# Patient Record
Sex: MALE | Race: ASIAN | NOT HISPANIC OR LATINO | URBAN - METROPOLITAN AREA
[De-identification: names, ages, dates, MRNs, and addresses within clinical notes are randomized per-mention and may not be internally consistent; named-entity substitution may affect disease eponyms.]

---

## 2017-02-17 ENCOUNTER — CHARTING TRANS (OUTPATIENT)
Dept: URGENT CARE | Age: 47
End: 2017-02-17

## 2017-02-17 ENCOUNTER — MYAURORA ACCOUNT LINK (OUTPATIENT)
Dept: OTHER | Age: 47
End: 2017-02-17

## 2017-02-17 ASSESSMENT — PAIN SCALES - GENERAL: PAINLEVEL_OUTOF10: 0

## 2017-02-18 ENCOUNTER — CHARTING TRANS (OUTPATIENT)
Dept: OTHER | Age: 47
End: 2017-02-18

## 2017-11-19 ENCOUNTER — CHARTING TRANS (OUTPATIENT)
Dept: URGENT CARE | Age: 47
End: 2017-11-19

## 2017-11-19 ENCOUNTER — MYAURORA ACCOUNT LINK (OUTPATIENT)
Dept: OTHER | Age: 47
End: 2017-11-19

## 2017-11-19 ASSESSMENT — PAIN SCALES - GENERAL: PAINLEVEL_OUTOF10: 5

## 2018-02-14 ENCOUNTER — MYAURORA ACCOUNT LINK (OUTPATIENT)
Dept: OTHER | Age: 48
End: 2018-02-14

## 2018-02-14 ENCOUNTER — CHARTING TRANS (OUTPATIENT)
Dept: OTHER | Age: 48
End: 2018-02-14

## 2018-11-27 VITALS
OXYGEN SATURATION: 97 % | DIASTOLIC BLOOD PRESSURE: 87 MMHG | RESPIRATION RATE: 16 BRPM | HEART RATE: 86 BPM | TEMPERATURE: 97.7 F | SYSTOLIC BLOOD PRESSURE: 130 MMHG

## 2018-11-29 VITALS
HEART RATE: 94 BPM | OXYGEN SATURATION: 98 % | TEMPERATURE: 98.3 F | RESPIRATION RATE: 16 BRPM | DIASTOLIC BLOOD PRESSURE: 74 MMHG | SYSTOLIC BLOOD PRESSURE: 102 MMHG

## 2019-03-06 VITALS
SYSTOLIC BLOOD PRESSURE: 108 MMHG | DIASTOLIC BLOOD PRESSURE: 74 MMHG | OXYGEN SATURATION: 99 % | TEMPERATURE: 100.2 F | HEART RATE: 106 BPM | RESPIRATION RATE: 16 BRPM

## 2022-06-22 ENCOUNTER — ANESTHESIA (OUTPATIENT)
Dept: CARDIAC SURGERY | Facility: HOSPITAL | Age: 52
End: 2022-06-22
Payer: COMMERCIAL

## 2022-06-22 ENCOUNTER — HOSPITAL ENCOUNTER (INPATIENT)
Facility: HOSPITAL | Age: 52
LOS: 6 days | Discharge: HOME | End: 2022-06-28
Attending: EMERGENCY MEDICINE | Admitting: INTERNAL MEDICINE
Payer: COMMERCIAL

## 2022-06-22 ENCOUNTER — APPOINTMENT (OUTPATIENT)
Dept: CV DIAGNOSTICS | Facility: HOSPITAL | Age: 52
End: 2022-06-22
Attending: INTERNAL MEDICINE
Payer: COMMERCIAL

## 2022-06-22 ENCOUNTER — APPOINTMENT (OUTPATIENT)
Dept: GENERAL RADIOLOGY | Facility: HOSPITAL | Age: 52
End: 2022-06-22
Payer: COMMERCIAL

## 2022-06-22 ENCOUNTER — APPOINTMENT (OUTPATIENT)
Dept: INTERVENTIONAL RADIOLOGY/VASCULAR | Facility: HOSPITAL | Age: 52
End: 2022-06-22
Attending: INTERNAL MEDICINE
Payer: COMMERCIAL

## 2022-06-22 ENCOUNTER — ANESTHESIA EVENT (OUTPATIENT)
Dept: CARDIAC SURGERY | Facility: HOSPITAL | Age: 52
End: 2022-06-22
Payer: COMMERCIAL

## 2022-06-22 ENCOUNTER — APPOINTMENT (OUTPATIENT)
Dept: ULTRASOUND IMAGING | Facility: HOSPITAL | Age: 52
End: 2022-06-22
Attending: STUDENT IN AN ORGANIZED HEALTH CARE EDUCATION/TRAINING PROGRAM
Payer: COMMERCIAL

## 2022-06-22 ENCOUNTER — APPOINTMENT (OUTPATIENT)
Dept: GENERAL RADIOLOGY | Facility: HOSPITAL | Age: 52
End: 2022-06-22
Attending: THORACIC SURGERY (CARDIOTHORACIC VASCULAR SURGERY)
Payer: COMMERCIAL

## 2022-06-22 DIAGNOSIS — R57.0 CARDIOGENIC SHOCK (HCC): ICD-10-CM

## 2022-06-22 DIAGNOSIS — I21.4 NSTEMI (NON-ST ELEVATED MYOCARDIAL INFARCTION) (HCC): ICD-10-CM

## 2022-06-22 DIAGNOSIS — J90 PLEURAL EFFUSION: ICD-10-CM

## 2022-06-22 DIAGNOSIS — I25.10 CAD, MULTIPLE VESSEL: ICD-10-CM

## 2022-06-22 DIAGNOSIS — E11.9 TYPE 2 DIABETES MELLITUS WITHOUT COMPLICATION, WITHOUT LONG-TERM CURRENT USE OF INSULIN (HCC): ICD-10-CM

## 2022-06-22 DIAGNOSIS — R07.9 ACUTE CHEST PAIN: Primary | ICD-10-CM

## 2022-06-22 LAB
ADENOVIRUS PCR:: NOT DETECTED
ALBUMIN SERPL-MCNC: 4.1 G/DL (ref 3.4–5)
ALBUMIN/GLOB SERPL: 1.2 {RATIO} (ref 1–2)
ALP LIVER SERPL-CCNC: 130 U/L
ALT SERPL-CCNC: 46 U/L
ANION GAP SERPL CALC-SCNC: 6 MMOL/L (ref 0–18)
ANTIBODY SCREEN: NEGATIVE
AST SERPL-CCNC: 29 U/L (ref 15–37)
ATRIAL RATE: 67 BPM
ATRIAL RATE: 68 BPM
B PARAPERT DNA SPEC QL NAA+PROBE: NOT DETECTED
B PERT DNA SPEC QL NAA+PROBE: NOT DETECTED
BASE EXCESS BLD CALC-SCNC: -1 MMOL/L
BASE EXCESS BLD CALC-SCNC: -1 MMOL/L
BASE EXCESS BLD CALC-SCNC: -4 MMOL/L
BASE EXCESS BLDA CALC-SCNC: -1 MMOL/L (ref ?–30)
BASE EXCESS BLDMV CALC-SCNC: -3 MMOL/L (ref ?–30)
BASE EXCESS BLDMV CALC-SCNC: 0 MMOL/L (ref ?–30)
BASE EXCESS BLDMV CALC-SCNC: 1 MMOL/L (ref ?–30)
BASOPHILS # BLD AUTO: 0.03 X10(3) UL (ref 0–0.2)
BASOPHILS NFR BLD AUTO: 0.2 %
BILIRUB SERPL-MCNC: 0.9 MG/DL (ref 0.1–2)
BUN BLD-MCNC: 8 MG/DL (ref 7–18)
BUN BLD-MCNC: 8 MG/DL (ref 7–18)
C PNEUM DNA SPEC QL NAA+PROBE: NOT DETECTED
CA-I BLD-SCNC: 1.2 MMOL/L (ref 1.12–1.32)
CA-I BLD-SCNC: 1.26 MMOL/L (ref 1.12–1.32)
CA-I BLD-SCNC: 1.28 MMOL/L (ref 1.12–1.32)
CA-I BLDA-SCNC: 1.4 MMOL/L (ref 1.12–1.32)
CA-I BLDMV-SCNC: 1.11 MMOL/L (ref 1.12–1.32)
CA-I BLDMV-SCNC: 1.13 MMOL/L (ref 1.12–1.32)
CA-I BLDMV-SCNC: 1.14 MMOL/L (ref 1.12–1.32)
CALCIUM BLD-MCNC: 8.3 MG/DL (ref 8.5–10.1)
CALCIUM BLD-MCNC: 9.7 MG/DL (ref 8.5–10.1)
CHLORIDE SERPL-SCNC: 102 MMOL/L (ref 98–112)
CHLORIDE SERPL-SCNC: 112 MMOL/L (ref 98–112)
CHOLEST SERPL-MCNC: 228 MG/DL (ref ?–200)
CO2 BLD-SCNC: 23 MMOL/L (ref 22–32)
CO2 BLD-SCNC: 24 MMOL/L (ref 22–32)
CO2 BLD-SCNC: 25 MMOL/L (ref 22–32)
CO2 BLDA-SCNC: 26 MMOL/L (ref 22–32)
CO2 BLDMV-SCNC: 24 MMOL/L (ref 22–32)
CO2 BLDMV-SCNC: 26 MMOL/L (ref 22–32)
CO2 BLDMV-SCNC: 27 MMOL/L (ref 22–32)
CO2 SERPL-SCNC: 23 MMOL/L (ref 21–32)
CO2 SERPL-SCNC: 28 MMOL/L (ref 21–32)
CORONAVIRUS 229E PCR:: NOT DETECTED
CORONAVIRUS HKU1 PCR:: NOT DETECTED
CORONAVIRUS NL63 PCR:: NOT DETECTED
CORONAVIRUS OC43 PCR:: NOT DETECTED
CREAT BLD-MCNC: 0.79 MG/DL
CREAT BLD-MCNC: 0.88 MG/DL
D DIMER PPP FEU-MCNC: <0.27 UG/ML FEU (ref ?–0.5)
EOSINOPHIL # BLD AUTO: 0.08 X10(3) UL (ref 0–0.7)
EOSINOPHIL NFR BLD AUTO: 0.6 %
ERYTHROCYTE [DISTWIDTH] IN BLOOD BY AUTOMATED COUNT: 11.9 %
ERYTHROCYTE [DISTWIDTH] IN BLOOD BY AUTOMATED COUNT: 12 %
EST. AVERAGE GLUCOSE BLD GHB EST-MCNC: 275 MG/DL (ref 68–126)
FLUAV RNA SPEC QL NAA+PROBE: NOT DETECTED
FLUBV RNA SPEC QL NAA+PROBE: NOT DETECTED
GLOBULIN PLAS-MCNC: 3.3 G/DL (ref 2.8–4.4)
GLUCOSE BLD-MCNC: 148 MG/DL (ref 70–99)
GLUCOSE BLD-MCNC: 152 MG/DL (ref 70–99)
GLUCOSE BLD-MCNC: 177 MG/DL (ref 70–99)
GLUCOSE BLD-MCNC: 196 MG/DL (ref 70–99)
GLUCOSE BLD-MCNC: 196 MG/DL (ref 70–99)
GLUCOSE BLD-MCNC: 198 MG/DL (ref 70–99)
GLUCOSE BLD-MCNC: 201 MG/DL (ref 70–99)
GLUCOSE BLD-MCNC: 205 MG/DL (ref 70–99)
GLUCOSE BLD-MCNC: 206 MG/DL (ref 70–99)
GLUCOSE BLD-MCNC: 216 MG/DL (ref 70–99)
GLUCOSE BLD-MCNC: 226 MG/DL (ref 70–99)
GLUCOSE BLD-MCNC: 234 MG/DL (ref 70–99)
GLUCOSE BLD-MCNC: 284 MG/DL (ref 70–99)
GLUCOSE BLD-MCNC: 303 MG/DL (ref 70–99)
GLUCOSE BLDA-MCNC: 228 MG/DL (ref 70–99)
HBA1C MFR BLD: 11.2 % (ref ?–5.7)
HCO3 BLD-SCNC: 21.5 MEQ/L
HCO3 BLD-SCNC: 22.9 MEQ/L
HCO3 BLD-SCNC: 23.8 MEQ/L
HCO3 BLDA-SCNC: 24.8 MEQ/L (ref 22–26)
HCO3 BLDMV-SCNC: 23 MEQ/L (ref 22–26)
HCO3 BLDMV-SCNC: 24.9 MEQ/L (ref 22–26)
HCO3 BLDMV-SCNC: 25.9 MEQ/L (ref 22–26)
HCT VFR BLD AUTO: 38.2 %
HCT VFR BLD AUTO: 46.5 %
HCT VFR BLD CALC: 37 %
HCT VFR BLD CALC: 37 %
HCT VFR BLD CALC: 38 %
HCT VFR BLDA CALC: 31 %
HCT VFR BLDMV CALC: 25 %
HCT VFR BLDMV CALC: 26 %
HCT VFR BLDMV CALC: 27 %
HDLC SERPL-MCNC: 32 MG/DL (ref 40–59)
HGB BLD-MCNC: 13.6 G/DL
HGB BLD-MCNC: 16.1 G/DL
IMM GRANULOCYTES # BLD AUTO: 0.04 X10(3) UL (ref 0–1)
IMM GRANULOCYTES NFR BLD: 0.3 %
INR BLD: 1.25 (ref 0.8–1.2)
ISTAT ACTIVATED CLOTTING TIME: 132 SECONDS (ref 74–137)
ISTAT ACTIVATED CLOTTING TIME: 138 SECONDS (ref 74–137)
ISTAT ACTIVATED CLOTTING TIME: 138 SECONDS (ref 74–137)
ISTAT ACTIVATED CLOTTING TIME: 156 SECONDS (ref 74–137)
ISTAT ACTIVATED CLOTTING TIME: 462 SECONDS (ref 74–137)
ISTAT ACTIVATED CLOTTING TIME: 486 SECONDS (ref 74–137)
ISTAT ACTIVATED CLOTTING TIME: 573 SECONDS (ref 74–137)
ISTAT ACTIVATED CLOTTING TIME: 741 SECONDS (ref 74–137)
ISTAT PATIENT TEMPERATURE: 32 DEGREE
ISTAT PATIENT TEMPERATURE: 32 DEGREE
LDLC SERPL CALC-MCNC: 171 MG/DL (ref ?–100)
LYMPHOCYTES # BLD AUTO: 2.49 X10(3) UL (ref 1–4)
LYMPHOCYTES NFR BLD AUTO: 19.6 %
MAGNESIUM SERPL-MCNC: 2.2 MG/DL (ref 1.6–2.6)
MCH RBC QN AUTO: 30.7 PG (ref 26–34)
MCH RBC QN AUTO: 31.1 PG (ref 26–34)
MCHC RBC AUTO-ENTMCNC: 34.6 G/DL (ref 31–37)
MCHC RBC AUTO-ENTMCNC: 35.6 G/DL (ref 31–37)
MCV RBC AUTO: 87.2 FL
MCV RBC AUTO: 88.6 FL
METAPNEUMOVIRUS PCR:: NOT DETECTED
MONOCYTES # BLD AUTO: 0.83 X10(3) UL (ref 0.1–1)
MONOCYTES NFR BLD AUTO: 6.5 %
MYCOPLASMA PNEUMONIA PCR:: NOT DETECTED
NEUTROPHILS # BLD AUTO: 9.21 X10 (3) UL (ref 1.5–7.7)
NEUTROPHILS # BLD AUTO: 9.21 X10(3) UL (ref 1.5–7.7)
NEUTROPHILS NFR BLD AUTO: 72.8 %
NONHDLC SERPL-MCNC: 196 MG/DL (ref ?–130)
OSMOLALITY SERPL CALC.SUM OF ELEC: 292 MOSM/KG (ref 275–295)
P AXIS: -16 DEGREES
P AXIS: 5 DEGREES
P-R INTERVAL: 156 MS
P-R INTERVAL: 160 MS
PARAINFLUENZA 1 PCR:: NOT DETECTED
PARAINFLUENZA 2 PCR:: NOT DETECTED
PARAINFLUENZA 3 PCR:: NOT DETECTED
PARAINFLUENZA 4 PCR:: NOT DETECTED
PCO2 BLD: 33.9 MMHG
PCO2 BLD: 35.9 MMHG
PCO2 BLD: 40.3 MMHG
PCO2 BLDA: 44.4 MMHG (ref 35–45)
PCO2 BLDMV: 33.3 MMHG (ref 38–50)
PCO2 BLDMV: 33.3 MMHG (ref 38–50)
PCO2 BLDMV: 42.1 MMHG (ref 38–50)
PH BLD: 7.38 [PH]
PH BLD: 7.39 [PH]
PH BLD: 7.44 [PH]
PH BLDA: 7.36 [PH] (ref 7.35–7.45)
PH BLDMV: 7.38 [PH] (ref 7.32–7.43)
PH BLDMV: 7.43 [PH] (ref 7.32–7.43)
PH BLDMV: 7.48 [PH] (ref 7.32–7.43)
PLATELET # BLD AUTO: 104 10(3)UL (ref 150–450)
PLATELET # BLD AUTO: 183 10(3)UL (ref 150–450)
PO2 BLD: 158 MMHG
PO2 BLD: 388 MMHG
PO2 BLD: 45 MMHG
PO2 BLDA: 215 MMHG (ref 80–105)
PO2 BLDMV: <70 MMHG (ref 35–40)
POTASSIUM BLD-SCNC: 3.4 MMOL/L (ref 3.6–5.1)
POTASSIUM BLD-SCNC: 3.4 MMOL/L (ref 3.6–5.1)
POTASSIUM BLD-SCNC: 3.8 MMOL/L (ref 3.6–5.1)
POTASSIUM SERPL-SCNC: 3.5 MMOL/L (ref 3.5–5.1)
POTASSIUM SERPL-SCNC: 4.1 MMOL/L (ref 3.5–5.1)
PROT SERPL-MCNC: 7.4 G/DL (ref 6.4–8.2)
PROTHROMBIN TIME: 15.7 SECONDS (ref 11.6–14.8)
Q-T INTERVAL: 368 MS
Q-T INTERVAL: 392 MS
QRS DURATION: 90 MS
QRS DURATION: 90 MS
QTC CALCULATION (BEZET): 391 MS
QTC CALCULATION (BEZET): 414 MS
R AXIS: 66 DEGREES
R AXIS: 66 DEGREES
RBC # BLD AUTO: 4.38 X10(6)UL
RBC # BLD AUTO: 5.25 X10(6)UL
RH BLOOD TYPE: POSITIVE
RHINOVIRUS/ENTERO PCR:: NOT DETECTED
RSV RNA SPEC QL NAA+PROBE: NOT DETECTED
SAO2 % BLD: 100 %
SAO2 % BLD: 80 %
SAO2 % BLD: 99 %
SAO2 % BLDA: 100 % (ref 92–100)
SAO2 % BLDMV: 75 % (ref 60–85)
SAO2 % BLDMV: 82 % (ref 60–85)
SAO2 % BLDMV: 84 % (ref 60–85)
SARS-COV-2 RNA NPH QL NAA+NON-PROBE: NOT DETECTED
SARS-COV-2 RNA RESP QL NAA+PROBE: NOT DETECTED
SODIUM BLD-SCNC: 136 MMOL/L (ref 136–145)
SODIUM BLD-SCNC: 137 MMOL/L (ref 136–145)
SODIUM BLD-SCNC: 140 MMOL/L (ref 136–145)
SODIUM BLDA-SCNC: 134 MMOL/L (ref 136–145)
SODIUM BLDA-SCNC: 4.4 MMOL/L (ref 3.6–5.1)
SODIUM BLDMV-SCNC: 130 MMOL/L (ref 136–145)
SODIUM BLDMV-SCNC: 132 MMOL/L (ref 136–145)
SODIUM BLDMV-SCNC: 133 MMOL/L (ref 136–145)
SODIUM BLDMV-SCNC: 5.5 MMOL/L (ref 3.6–5.1)
SODIUM BLDMV-SCNC: 5.6 MMOL/L (ref 3.6–5.1)
SODIUM BLDMV-SCNC: 6 MMOL/L (ref 3.6–5.1)
SODIUM SERPL-SCNC: 136 MMOL/L (ref 136–145)
SODIUM SERPL-SCNC: 140 MMOL/L (ref 136–145)
T AXIS: -18 DEGREES
T AXIS: -22 DEGREES
TRIGL SERPL-MCNC: 133 MG/DL (ref 30–149)
TROPONIN I HIGH SENSITIVITY: 21 NG/L
TROPONIN I HIGH SENSITIVITY: 2651 NG/L
TROPONIN I HIGH SENSITIVITY: 269 NG/L
TSI SER-ACNC: 1.1 MIU/ML (ref 0.36–3.74)
VENTRICULAR RATE: 67 BPM
VENTRICULAR RATE: 68 BPM
VLDLC SERPL CALC-MCNC: 27 MG/DL (ref 0–30)
WBC # BLD AUTO: 12.7 X10(3) UL (ref 4–11)
WBC # BLD AUTO: 14.9 X10(3) UL (ref 4–11)

## 2022-06-22 PROCEDURE — B3121ZZ FLUOROSCOPY OF LEFT SUBCLAVIAN ARTERY USING LOW OSMOLAR CONTRAST: ICD-10-PCS | Performed by: INTERNAL MEDICINE

## 2022-06-22 PROCEDURE — 93306 TTE W/DOPPLER COMPLETE: CPT | Performed by: INTERNAL MEDICINE

## 2022-06-22 PROCEDURE — 5A1221Z PERFORMANCE OF CARDIAC OUTPUT, CONTINUOUS: ICD-10-PCS | Performed by: THORACIC SURGERY (CARDIOTHORACIC VASCULAR SURGERY)

## 2022-06-22 PROCEDURE — S0028 INJECTION, FAMOTIDINE, 20 MG: HCPCS | Performed by: ANESTHESIOLOGY

## 2022-06-22 PROCEDURE — 71045 X-RAY EXAM CHEST 1 VIEW: CPT | Performed by: EMERGENCY MEDICINE

## 2022-06-22 PROCEDURE — 71045 X-RAY EXAM CHEST 1 VIEW: CPT | Performed by: THORACIC SURGERY (CARDIOTHORACIC VASCULAR SURGERY)

## 2022-06-22 PROCEDURE — 02100Z9 BYPASS CORONARY ARTERY, ONE ARTERY FROM LEFT INTERNAL MAMMARY, OPEN APPROACH: ICD-10-PCS | Performed by: THORACIC SURGERY (CARDIOTHORACIC VASCULAR SURGERY)

## 2022-06-22 PROCEDURE — 06BQ4ZZ EXCISION OF LEFT SAPHENOUS VEIN, PERCUTANEOUS ENDOSCOPIC APPROACH: ICD-10-PCS | Performed by: THORACIC SURGERY (CARDIOTHORACIC VASCULAR SURGERY)

## 2022-06-22 PROCEDURE — 4A023N7 MEASUREMENT OF CARDIAC SAMPLING AND PRESSURE, LEFT HEART, PERCUTANEOUS APPROACH: ICD-10-PCS | Performed by: INTERNAL MEDICINE

## 2022-06-22 PROCEDURE — 3046F HEMOGLOBIN A1C LEVEL >9.0%: CPT | Performed by: STUDENT IN AN ORGANIZED HEALTH CARE EDUCATION/TRAINING PROGRAM

## 2022-06-22 PROCEDURE — 99223 1ST HOSP IP/OBS HIGH 75: CPT | Performed by: INTERNAL MEDICINE

## 2022-06-22 PROCEDURE — B2151ZZ FLUOROSCOPY OF LEFT HEART USING LOW OSMOLAR CONTRAST: ICD-10-PCS | Performed by: INTERNAL MEDICINE

## 2022-06-22 PROCEDURE — 021209W BYPASS CORONARY ARTERY, THREE ARTERIES FROM AORTA WITH AUTOLOGOUS VENOUS TISSUE, OPEN APPROACH: ICD-10-PCS | Performed by: THORACIC SURGERY (CARDIOTHORACIC VASCULAR SURGERY)

## 2022-06-22 PROCEDURE — 5A02210 ASSISTANCE WITH CARDIAC OUTPUT USING BALLOON PUMP, CONTINUOUS: ICD-10-PCS | Performed by: INTERNAL MEDICINE

## 2022-06-22 PROCEDURE — B2111ZZ FLUOROSCOPY OF MULTIPLE CORONARY ARTERIES USING LOW OSMOLAR CONTRAST: ICD-10-PCS | Performed by: INTERNAL MEDICINE

## 2022-06-22 PROCEDURE — 76942 ECHO GUIDE FOR BIOPSY: CPT | Performed by: ANESTHESIOLOGY

## 2022-06-22 PROCEDURE — 93312 ECHO TRANSESOPHAGEAL: CPT | Performed by: ANESTHESIOLOGY

## 2022-06-22 PROCEDURE — B246ZZ4 ULTRASONOGRAPHY OF RIGHT AND LEFT HEART, TRANSESOPHAGEAL: ICD-10-PCS | Performed by: THORACIC SURGERY (CARDIOTHORACIC VASCULAR SURGERY)

## 2022-06-22 RX ORDER — MAGNESIUM SULFATE HEPTAHYDRATE 40 MG/ML
2 INJECTION, SOLUTION INTRAVENOUS AS NEEDED
Status: DISCONTINUED | OUTPATIENT
Start: 2022-06-22 | End: 2022-06-28

## 2022-06-22 RX ORDER — FAMOTIDINE 10 MG/ML
20 INJECTION, SOLUTION INTRAVENOUS 2 TIMES DAILY
Status: DISCONTINUED | OUTPATIENT
Start: 2022-06-22 | End: 2022-06-28

## 2022-06-22 RX ORDER — NICOTINE POLACRILEX 4 MG
30 LOZENGE BUCCAL
Status: DISCONTINUED | OUTPATIENT
Start: 2022-06-22 | End: 2022-06-28

## 2022-06-22 RX ORDER — FAMOTIDINE 20 MG/1
20 TABLET, FILM COATED ORAL 2 TIMES DAILY
Status: DISCONTINUED | OUTPATIENT
Start: 2022-06-22 | End: 2022-06-28

## 2022-06-22 RX ORDER — METHYLPREDNISOLONE SODIUM SUCCINATE 500 MG/8ML
INJECTION INTRAMUSCULAR; INTRAVENOUS AS NEEDED
Status: DISCONTINUED | OUTPATIENT
Start: 2022-06-22 | End: 2022-06-22 | Stop reason: SURG

## 2022-06-22 RX ORDER — DEXMEDETOMIDINE HYDROCHLORIDE 4 UG/ML
INJECTION, SOLUTION INTRAVENOUS CONTINUOUS
Status: DISCONTINUED | OUTPATIENT
Start: 2022-06-22 | End: 2022-06-26

## 2022-06-22 RX ORDER — MORPHINE SULFATE 4 MG/ML
4 INJECTION, SOLUTION INTRAMUSCULAR; INTRAVENOUS EVERY 2 HOUR PRN
Status: DISCONTINUED | OUTPATIENT
Start: 2022-06-22 | End: 2022-06-26

## 2022-06-22 RX ORDER — CEFAZOLIN SODIUM/WATER 2 G/20 ML
SYRINGE (ML) INTRAVENOUS AS NEEDED
Status: DISCONTINUED | OUTPATIENT
Start: 2022-06-22 | End: 2022-06-22 | Stop reason: SURG

## 2022-06-22 RX ORDER — HEPARIN SODIUM AND DEXTROSE 10000; 5 [USP'U]/100ML; G/100ML
INJECTION INTRAVENOUS
Status: COMPLETED
Start: 2022-06-22 | End: 2022-06-22

## 2022-06-22 RX ORDER — HYDROCODONE BITARTRATE AND ACETAMINOPHEN 10; 325 MG/1; MG/1
1 TABLET ORAL EVERY 4 HOURS PRN
Status: DISCONTINUED | OUTPATIENT
Start: 2022-06-22 | End: 2022-06-28

## 2022-06-22 RX ORDER — MORPHINE SULFATE 4 MG/ML
4 INJECTION, SOLUTION INTRAMUSCULAR; INTRAVENOUS EVERY 2 HOUR PRN
Status: DISCONTINUED | OUTPATIENT
Start: 2022-06-22 | End: 2022-06-23 | Stop reason: SDUPTHER

## 2022-06-22 RX ORDER — ATORVASTATIN CALCIUM 40 MG/1
40 TABLET, FILM COATED ORAL NIGHTLY
Status: DISCONTINUED | OUTPATIENT
Start: 2022-06-22 | End: 2022-06-22

## 2022-06-22 RX ORDER — NICOTINE POLACRILEX 4 MG
15 LOZENGE BUCCAL
Status: DISCONTINUED | OUTPATIENT
Start: 2022-06-22 | End: 2022-06-28

## 2022-06-22 RX ORDER — ROCURONIUM BROMIDE 10 MG/ML
INJECTION, SOLUTION INTRAVENOUS AS NEEDED
Status: DISCONTINUED | OUTPATIENT
Start: 2022-06-22 | End: 2022-06-22 | Stop reason: SURG

## 2022-06-22 RX ORDER — MIDAZOLAM HYDROCHLORIDE 1 MG/ML
1 INJECTION INTRAMUSCULAR; INTRAVENOUS EVERY 30 MIN PRN
Status: DISCONTINUED | OUTPATIENT
Start: 2022-06-22 | End: 2022-06-26

## 2022-06-22 RX ORDER — MORPHINE SULFATE 2 MG/ML
1 INJECTION, SOLUTION INTRAMUSCULAR; INTRAVENOUS EVERY 2 HOUR PRN
Status: DISCONTINUED | OUTPATIENT
Start: 2022-06-22 | End: 2022-06-23 | Stop reason: SDUPTHER

## 2022-06-22 RX ORDER — DEXTROSE MONOHYDRATE 25 G/50ML
50 INJECTION, SOLUTION INTRAVENOUS
Status: DISCONTINUED | OUTPATIENT
Start: 2022-06-22 | End: 2022-06-28

## 2022-06-22 RX ORDER — ACETAMINOPHEN 500 MG
500 TABLET ORAL EVERY 4 HOURS PRN
Status: DISCONTINUED | OUTPATIENT
Start: 2022-06-22 | End: 2022-06-25

## 2022-06-22 RX ORDER — MORPHINE SULFATE 4 MG/ML
6 INJECTION, SOLUTION INTRAMUSCULAR; INTRAVENOUS EVERY 2 HOUR PRN
Status: DISCONTINUED | OUTPATIENT
Start: 2022-06-22 | End: 2022-06-26

## 2022-06-22 RX ORDER — FAMOTIDINE 10 MG/ML
INJECTION, SOLUTION INTRAVENOUS AS NEEDED
Status: DISCONTINUED | OUTPATIENT
Start: 2022-06-22 | End: 2022-06-22 | Stop reason: SURG

## 2022-06-22 RX ORDER — MELATONIN
3 NIGHTLY PRN
Status: DISCONTINUED | OUTPATIENT
Start: 2022-06-22 | End: 2022-06-28

## 2022-06-22 RX ORDER — SODIUM CHLORIDE 9 MG/ML
INJECTION, SOLUTION INTRAVENOUS
Status: ACTIVE | OUTPATIENT
Start: 2022-06-23 | End: 2022-06-23

## 2022-06-22 RX ORDER — BISACODYL 10 MG
10 SUPPOSITORY, RECTAL RECTAL
Status: DISCONTINUED | OUTPATIENT
Start: 2022-06-22 | End: 2022-06-28

## 2022-06-22 RX ORDER — ASPIRIN 81 MG/1
324 TABLET, CHEWABLE ORAL ONCE
Status: COMPLETED | OUTPATIENT
Start: 2022-06-22 | End: 2022-06-22

## 2022-06-22 RX ORDER — ONDANSETRON 2 MG/ML
4 INJECTION INTRAMUSCULAR; INTRAVENOUS EVERY 6 HOURS PRN
Status: DISCONTINUED | OUTPATIENT
Start: 2022-06-22 | End: 2022-06-28

## 2022-06-22 RX ORDER — PHYTONADIONE 10 MG/ML
INJECTION, EMULSION INTRAMUSCULAR; INTRAVENOUS; SUBCUTANEOUS AS NEEDED
Status: DISCONTINUED | OUTPATIENT
Start: 2022-06-22 | End: 2022-06-22 | Stop reason: SURG

## 2022-06-22 RX ORDER — ASPIRIN 325 MG
325 TABLET ORAL DAILY
Status: DISCONTINUED | OUTPATIENT
Start: 2022-06-22 | End: 2022-06-23

## 2022-06-22 RX ORDER — ASPIRIN 325 MG
325 TABLET, DELAYED RELEASE (ENTERIC COATED) ORAL DAILY
Status: DISCONTINUED | OUTPATIENT
Start: 2022-06-23 | End: 2022-06-28

## 2022-06-22 RX ORDER — MORPHINE SULFATE 2 MG/ML
2 INJECTION, SOLUTION INTRAMUSCULAR; INTRAVENOUS EVERY 2 HOUR PRN
Status: DISCONTINUED | OUTPATIENT
Start: 2022-06-22 | End: 2022-06-26

## 2022-06-22 RX ORDER — SODIUM CHLORIDE 9 MG/ML
INJECTION, SOLUTION INTRAVENOUS CONTINUOUS
Status: DISCONTINUED | OUTPATIENT
Start: 2022-06-22 | End: 2022-06-26

## 2022-06-22 RX ORDER — DIPHENHYDRAMINE HYDROCHLORIDE 50 MG/ML
INJECTION INTRAMUSCULAR; INTRAVENOUS AS NEEDED
Status: DISCONTINUED | OUTPATIENT
Start: 2022-06-22 | End: 2022-06-22 | Stop reason: SURG

## 2022-06-22 RX ORDER — ATORVASTATIN CALCIUM 10 MG/1
10 TABLET, FILM COATED ORAL NIGHTLY
Status: DISCONTINUED | OUTPATIENT
Start: 2022-06-22 | End: 2022-06-23

## 2022-06-22 RX ORDER — ASPIRIN 81 MG/1
243 TABLET, CHEWABLE ORAL ONCE
Status: COMPLETED | OUTPATIENT
Start: 2022-06-22 | End: 2022-06-22

## 2022-06-22 RX ORDER — DOBUTAMINE HYDROCHLORIDE 200 MG/100ML
INJECTION INTRAVENOUS CONTINUOUS PRN
Status: DISCONTINUED | OUTPATIENT
Start: 2022-06-22 | End: 2022-06-26

## 2022-06-22 RX ORDER — ATORVASTATIN CALCIUM 10 MG/1
10 TABLET, FILM COATED ORAL NIGHTLY
Status: DISCONTINUED | OUTPATIENT
Start: 2022-06-22 | End: 2022-06-22

## 2022-06-22 RX ORDER — POLYETHYLENE GLYCOL 3350 17 G/17G
17 POWDER, FOR SOLUTION ORAL DAILY PRN
Status: DISCONTINUED | OUTPATIENT
Start: 2022-06-22 | End: 2022-06-28

## 2022-06-22 RX ORDER — HEPARIN SODIUM 5000 [USP'U]/ML
INJECTION, SOLUTION INTRAVENOUS; SUBCUTANEOUS
Status: COMPLETED
Start: 2022-06-22 | End: 2022-06-22

## 2022-06-22 RX ORDER — HEPARIN SODIUM AND DEXTROSE 10000; 5 [USP'U]/100ML; G/100ML
12 INJECTION INTRAVENOUS ONCE
Status: COMPLETED | OUTPATIENT
Start: 2022-06-22 | End: 2022-06-22

## 2022-06-22 RX ORDER — ENOXAPARIN SODIUM 100 MG/ML
40 INJECTION SUBCUTANEOUS DAILY
Status: DISCONTINUED | OUTPATIENT
Start: 2022-06-22 | End: 2022-06-22 | Stop reason: ALTCHOICE

## 2022-06-22 RX ORDER — LIDOCAINE HYDROCHLORIDE 10 MG/ML
INJECTION, SOLUTION EPIDURAL; INFILTRATION; INTRACAUDAL; PERINEURAL
Status: COMPLETED
Start: 2022-06-22 | End: 2022-06-22

## 2022-06-22 RX ORDER — ALBUMIN, HUMAN INJ 5% 5 %
250 SOLUTION INTRAVENOUS ONCE AS NEEDED
Status: COMPLETED | OUTPATIENT
Start: 2022-06-22 | End: 2022-06-22

## 2022-06-22 RX ORDER — MORPHINE SULFATE 2 MG/ML
2 INJECTION, SOLUTION INTRAMUSCULAR; INTRAVENOUS EVERY 2 HOUR PRN
Status: DISCONTINUED | OUTPATIENT
Start: 2022-06-22 | End: 2022-06-23 | Stop reason: SDUPTHER

## 2022-06-22 RX ORDER — IPRATROPIUM BROMIDE AND ALBUTEROL SULFATE 2.5; .5 MG/3ML; MG/3ML
3 SOLUTION RESPIRATORY (INHALATION) EVERY 4 HOURS PRN
Status: DISCONTINUED | OUTPATIENT
Start: 2022-06-22 | End: 2022-06-26

## 2022-06-22 RX ORDER — ASPIRIN 300 MG/1
300 SUPPOSITORY RECTAL DAILY
Status: DISCONTINUED | OUTPATIENT
Start: 2022-06-23 | End: 2022-06-28

## 2022-06-22 RX ORDER — CEFAZOLIN SODIUM 1 G/3ML
INJECTION, POWDER, FOR SOLUTION INTRAMUSCULAR; INTRAVENOUS AS NEEDED
Status: DISCONTINUED | OUTPATIENT
Start: 2022-06-22 | End: 2022-06-22 | Stop reason: HOSPADM

## 2022-06-22 RX ORDER — CHLORHEXIDINE GLUCONATE 0.12 MG/ML
15 RINSE ORAL
Status: DISCONTINUED | OUTPATIENT
Start: 2022-06-23 | End: 2022-06-24

## 2022-06-22 RX ORDER — SENNOSIDES 8.6 MG
17.2 TABLET ORAL NIGHTLY PRN
Status: DISCONTINUED | OUTPATIENT
Start: 2022-06-22 | End: 2022-06-28

## 2022-06-22 RX ORDER — INSULIN ASPART 100 [IU]/ML
0.15 INJECTION, SOLUTION INTRAVENOUS; SUBCUTANEOUS ONCE
Status: COMPLETED | OUTPATIENT
Start: 2022-06-22 | End: 2022-06-22

## 2022-06-22 RX ORDER — SODIUM CHLORIDE 9 MG/ML
INJECTION, SOLUTION INTRAVENOUS
Status: DISCONTINUED | OUTPATIENT
Start: 2022-06-23 | End: 2022-06-22 | Stop reason: HOSPADM

## 2022-06-22 RX ORDER — SODIUM PHOSPHATE, DIBASIC AND SODIUM PHOSPHATE, MONOBASIC 7; 19 G/133ML; G/133ML
1 ENEMA RECTAL ONCE AS NEEDED
Status: DISCONTINUED | OUTPATIENT
Start: 2022-06-22 | End: 2022-06-28

## 2022-06-22 RX ORDER — PROTAMINE SULFATE 10 MG/ML
INJECTION, SOLUTION INTRAVENOUS AS NEEDED
Status: DISCONTINUED | OUTPATIENT
Start: 2022-06-22 | End: 2022-06-22 | Stop reason: SURG

## 2022-06-22 RX ORDER — NITROGLYCERIN 20 MG/100ML
INJECTION INTRAVENOUS CONTINUOUS PRN
Status: DISCONTINUED | OUTPATIENT
Start: 2022-06-22 | End: 2022-06-28

## 2022-06-22 RX ORDER — NITROGLYCERIN 0.4 MG/1
0.4 TABLET SUBLINGUAL EVERY 5 MIN PRN
Status: DISCONTINUED | OUTPATIENT
Start: 2022-06-22 | End: 2022-06-22

## 2022-06-22 RX ORDER — SODIUM CHLORIDE 9 MG/ML
INJECTION, SOLUTION INTRAVENOUS CONTINUOUS PRN
Status: DISCONTINUED | OUTPATIENT
Start: 2022-06-22 | End: 2022-06-22 | Stop reason: SURG

## 2022-06-22 RX ORDER — HEPARIN SODIUM AND DEXTROSE 10000; 5 [USP'U]/100ML; G/100ML
INJECTION INTRAVENOUS CONTINUOUS
Status: DISCONTINUED | OUTPATIENT
Start: 2022-06-22 | End: 2022-06-25

## 2022-06-22 RX ORDER — CALCIUM CARBONATE 200(500)MG
500 TABLET,CHEWABLE ORAL 3 TIMES DAILY PRN
Status: DISCONTINUED | OUTPATIENT
Start: 2022-06-22 | End: 2022-06-25

## 2022-06-22 RX ORDER — MAGNESIUM SULFATE 1 G/100ML
1 INJECTION INTRAVENOUS AS NEEDED
Status: DISCONTINUED | OUTPATIENT
Start: 2022-06-22 | End: 2022-06-27

## 2022-06-22 RX ORDER — MIDAZOLAM HYDROCHLORIDE 1 MG/ML
INJECTION INTRAMUSCULAR; INTRAVENOUS
Status: COMPLETED
Start: 2022-06-22 | End: 2022-06-22

## 2022-06-22 RX ORDER — NITROGLYCERIN 0.4 MG/1
0.4 TABLET SUBLINGUAL EVERY 5 MIN PRN
Status: DISCONTINUED | OUTPATIENT
Start: 2022-06-22 | End: 2022-06-26

## 2022-06-22 RX ORDER — MIDAZOLAM HYDROCHLORIDE 1 MG/ML
INJECTION INTRAMUSCULAR; INTRAVENOUS AS NEEDED
Status: DISCONTINUED | OUTPATIENT
Start: 2022-06-22 | End: 2022-06-22 | Stop reason: SURG

## 2022-06-22 RX ORDER — HYDROCODONE BITARTRATE AND ACETAMINOPHEN 10; 325 MG/1; MG/1
2 TABLET ORAL EVERY 4 HOURS PRN
Status: DISCONTINUED | OUTPATIENT
Start: 2022-06-22 | End: 2022-06-28

## 2022-06-22 RX ORDER — DEXMEDETOMIDINE HYDROCHLORIDE 4 UG/ML
INJECTION, SOLUTION INTRAVENOUS CONTINUOUS PRN
Status: DISCONTINUED | OUTPATIENT
Start: 2022-06-22 | End: 2022-06-22 | Stop reason: SURG

## 2022-06-22 RX ORDER — HEPARIN SODIUM 1000 [USP'U]/ML
INJECTION, SOLUTION INTRAVENOUS; SUBCUTANEOUS AS NEEDED
Status: DISCONTINUED | OUTPATIENT
Start: 2022-06-22 | End: 2022-06-22 | Stop reason: SURG

## 2022-06-22 RX ORDER — POTASSIUM CHLORIDE 29.8 MG/ML
40 INJECTION INTRAVENOUS AS NEEDED
Status: DISCONTINUED | OUTPATIENT
Start: 2022-06-22 | End: 2022-06-27

## 2022-06-22 RX ORDER — ASPIRIN 81 MG/1
324 TABLET, CHEWABLE ORAL ONCE
Status: DISCONTINUED | OUTPATIENT
Start: 2022-06-22 | End: 2022-06-22

## 2022-06-22 RX ORDER — POTASSIUM CHLORIDE 14.9 MG/ML
20 INJECTION INTRAVENOUS AS NEEDED
Status: DISCONTINUED | OUTPATIENT
Start: 2022-06-22 | End: 2022-06-27

## 2022-06-22 RX ADMIN — ROCURONIUM BROMIDE 50 MG: 10 INJECTION, SOLUTION INTRAVENOUS at 20:46:00

## 2022-06-22 RX ADMIN — PROTAMINE SULFATE 50 MG: 10 INJECTION, SOLUTION INTRAVENOUS at 20:18:00

## 2022-06-22 RX ADMIN — MIDAZOLAM HYDROCHLORIDE 2 MG: 1 INJECTION INTRAMUSCULAR; INTRAVENOUS at 17:09:00

## 2022-06-22 RX ADMIN — PHYTONADIONE 5 MG: 10 INJECTION, EMULSION INTRAMUSCULAR; INTRAVENOUS; SUBCUTANEOUS at 20:19:00

## 2022-06-22 RX ADMIN — PROTAMINE SULFATE 50 MG: 10 INJECTION, SOLUTION INTRAVENOUS at 20:17:00

## 2022-06-22 RX ADMIN — PROTAMINE SULFATE 50 MG: 10 INJECTION, SOLUTION INTRAVENOUS at 20:15:00

## 2022-06-22 RX ADMIN — SODIUM CHLORIDE: 9 INJECTION, SOLUTION INTRAVENOUS at 17:08:00

## 2022-06-22 RX ADMIN — DEXMEDETOMIDINE HYDROCHLORIDE 0.5 MCG/KG/HR: 4 INJECTION, SOLUTION INTRAVENOUS at 17:56:00

## 2022-06-22 RX ADMIN — FAMOTIDINE 20 MG: 10 INJECTION, SOLUTION INTRAVENOUS at 17:08:00

## 2022-06-22 RX ADMIN — CEFAZOLIN SODIUM/WATER 2 G: 2 G/20 ML SYRINGE (ML) INTRAVENOUS at 17:45:00

## 2022-06-22 RX ADMIN — PHYTONADIONE 5 MG: 10 INJECTION, EMULSION INTRAMUSCULAR; INTRAVENOUS; SUBCUTANEOUS at 20:18:00

## 2022-06-22 RX ADMIN — METHYLPREDNISOLONE SODIUM SUCCINATE 250 MG: 500 INJECTION INTRAMUSCULAR; INTRAVENOUS at 18:00:00

## 2022-06-22 RX ADMIN — MIDAZOLAM HYDROCHLORIDE 2 MG: 1 INJECTION INTRAMUSCULAR; INTRAVENOUS at 17:13:00

## 2022-06-22 RX ADMIN — MIDAZOLAM HYDROCHLORIDE 2 MG: 1 INJECTION INTRAMUSCULAR; INTRAVENOUS at 20:46:00

## 2022-06-22 RX ADMIN — ROCURONIUM BROMIDE 100 MG: 10 INJECTION, SOLUTION INTRAVENOUS at 18:41:00

## 2022-06-22 RX ADMIN — ROCURONIUM BROMIDE 50 MG: 10 INJECTION, SOLUTION INTRAVENOUS at 20:05:00

## 2022-06-22 RX ADMIN — ROCURONIUM BROMIDE 100 MG: 10 INJECTION, SOLUTION INTRAVENOUS at 17:13:00

## 2022-06-22 RX ADMIN — SODIUM CHLORIDE: 9 INJECTION, SOLUTION INTRAVENOUS at 21:33:00

## 2022-06-22 RX ADMIN — PROTAMINE SULFATE 50 MG: 10 INJECTION, SOLUTION INTRAVENOUS at 20:16:00

## 2022-06-22 RX ADMIN — DIPHENHYDRAMINE HYDROCHLORIDE 50 MG: 50 INJECTION INTRAMUSCULAR; INTRAVENOUS at 17:08:00

## 2022-06-22 RX ADMIN — MIDAZOLAM HYDROCHLORIDE 4 MG: 1 INJECTION INTRAMUSCULAR; INTRAVENOUS at 18:41:00

## 2022-06-22 RX ADMIN — PROTAMINE SULFATE 50 MG: 10 INJECTION, SOLUTION INTRAVENOUS at 20:19:00

## 2022-06-22 RX ADMIN — HEPARIN SODIUM 18000 UNITS: 1000 INJECTION, SOLUTION INTRAVENOUS; SUBCUTANEOUS at 18:24:00

## 2022-06-22 NOTE — PROCEDURES
Preprocedure was dictated:    Procedure performed:  1. Selective coronary angiography. 2.  LV gram.  3.  Selective left subclavian artery angiography. 4.  Successful intra-aortic balloon pump placement through the right femoral artery. Pre and postoperative diagnosis:  -Acute non-STEMI  -Intermittent chest  -Uncontrolled diabetes mellitus type 2 with hemoglobin A1c of 11.2%  -Dyslipidemia  -Active smoking    Selective coronary angiography findings:  -Left main -mild distal disease  -LAD-subtotal ostial LAD and very proximal LAD disease followed by 2 cm long 90% stenosis with mild to moderate disease in apical segment -BONG I flow in LAD system  -Major diagonal branch -diffuse proximal disease with chronic subtotal occlusion with distal diagonal segments filling from left to left collaterals and very proximal diagonal segment filling in antegrade fashion  -Left circumflex -80% proximal stenosis  -major OM - no disease  -Ramus intermedius -80% proximal stenosis  -Right coronary artery-dominant system with 99% distal stenosis  -Right PDA and right PL branch with mild disease  Right to left collaterals from distal RCA through the septal branches to entire LAD vessel    No thoracic or abdominal aorta aneurysm by angiography in Cath Lab. LV gram revealed mildly reduced LV systolic function with LVEF of 45 to 50% with akinetic apex but otherwise no significant wall motion abnormalities. No MR angiographically. No pressure gradient across aortic valve. Subclavian artery with no disease angiographically left internal mammary artery with no disease angiographically and not crossing the sternum. Intra-aortic balloon pump 7.5 Malay 40 cc was placed successfully to the right femoral artery and secured to the skin with silk and Tegaderm.     Plan:  -CCU admit  -CV surgery consult to evaluate for CABG  -Intra-aortic balloon pump one-to-one  -We initiated IV heparin bolus followed by drip per acute coronary syndrome protocol and for aortic balloon pump -follow acute coronary syndrome protocol  -Start low-dose of beta-blocker  -Start IV nitroglycerin drip at low rate if chest pain  -IV fluids at 60 cc/h normal saline and patient was given bolus 250cc normal saline in Cath Lab  -Aspirin and statin    All discussed with the patient and his wife and the nursing staff in detail. Discussed with Dr. Marylee Hone who referred the patient for cardiac catheterization.   Communicated with CV surgery  [facility] cardiovascular [place]  June 22, 2022

## 2022-06-22 NOTE — ANESTHESIA PROCEDURE NOTES
Airway  Date/Time: 6/22/2022 5:17 PM  Urgency: elective    Airway not difficult    General Information and Staff    Patient location during procedure: OR  Anesthesiologist: Radha Olmedo MD  Performed: anesthesiologist     Indications and Patient Condition  Indications for airway management: anesthesia  Sedation level: deep  Preoxygenated: yes  Patient position: sniffing  Mask difficulty assessment: 1 - vent by mask    Final Airway Details  Final airway type: endotracheal airway      Successful airway: ETT  Cuffed: yes   Successful intubation technique: direct laryngoscopy  Endotracheal tube insertion site: oral  Blade: Fer  Blade size: #3  ETT size (mm): 8.0    Cormack-Lehane Classification: grade I - full view of glottis  Placement verified by: chest auscultation and capnometry   Cuff volume (mL): 9  Measured from: lips  ETT to lips (cm): 22  Number of attempts at approach: 1

## 2022-06-22 NOTE — ANESTHESIA PROCEDURE NOTES
Procedure Performed: PATT     Start Time:  6/22/2022 6:00 PM       End Time:      Preanesthesia Checklist:  Patient identified, IV assessed, risks and benefits discussed, monitors and equipment assessed, procedure being performed at surgeon's request and anesthesia consent obtained. General Procedure Information  Diagnostic Indications for Echo:  assessment of ascending aorta and hemodynamic monitoring  Physician Requesting Echo: Olivia Watson MD  Location performed:  OR  Intubated  Bite block placed  Heart visualized  Probe Insertion:  Easy  Probe Type:  Multiplane  Modalities:  2D only, color flow mapping, pulse wave Doppler and continuous wave Doppler    Echocardiographic and Doppler Measurements    Ventricles    Right Ventricle:  Cavity size normal.  Hypertrophy not present. Thrombus not present. Global function normal.    Left Ventricle: Thrombus not present. Global Function mildly impaired. Ejection Fraction 45%. Ventricular Regional Function:  1- Basal Anteroseptal:  hypokinetic  2- Basal Anterior:  hypokinetic  3- Basal Anterolateral:  normal  4- Basal Inferolateral:  normal  5- Basal Inferior:  normal  6- Basal Inferoseptal:  normal  7- Mid Anteroseptal:  hypokinetic  8- Mid Anterior:  hypokinetic  9- Mid Anterolateral:  normal  10- Mid Inferolateral:  normal  11- Mid Inferior:  normal  12- Mid Inferoseptal:  normal  13- Apical Anterior:  hypokinetic  14- Apical Lateral:  hypokinetic  15- Apical Inferior:  hypokinetic  16- Apical Septal:  hypokinetic  17- [place]:  hypokinetic      Valves    Aortic Valve: Annulus normal.  Stenosis not present. Regurgitation absent. Leaflets normal.  Leaflet motions normal.      Mitral Valve: Annulus normal.  Stenosis not present. Regurgitation +1. Leaflets normal.  Leaflet motions normal.      Tricuspid Valve: Annulus normal.  Stenosis not present. Regurgitation +1. Leaflets normal.  Leaflet motions normal.    Pulmonic Valve:   Annulus normal. Aorta    Ascending Aorta:  Size normal.  Dissection not present. Plaque thickness less than 3 mm. Mobile plaque not present. Aortic Arch:  Size normal.  Dissection not present. Plaque thickness less than 3 mm. Mobile plaque not present. Descending Aorta:  Size normal.  Dissection not present. Plaque thickness less than 3 mm. Mobile plaque not present. Other Aortic Findings:       IABP in place in good position    Atria    Right Atrium:  Size normal.  Spontaneous echo contrast not present. Thrombus not present. Tumor not present. Device present. Left Atrium:  Size normal.  Spontaneous echo contrast not present. Thrombus not present. Tumor not present. Device not present.     Left atrial appendage normal.      Septa    Atrial Septum:  Intra-atrial septal morphology normal.      Ventricular Septum:  Intra-ventricular septum morphology normal.          Other Findings  Pericardium:  normal  Pleural Effusion:  none  Pulmonary Arteries:  normal  Pulmonary Venous Flow:  normal    Anesthesia Information  Performed Personally  Anesthesiologist:  Bryan Greene MD      Post    Ventricular FXN:  Global FXN: Improved   Regional FXN: Improved  Valve FXN:  Native Valve:No change      Comments: No aortic dissection,  IABP in good position     Complications:None

## 2022-06-22 NOTE — ANESTHESIA PROCEDURE NOTES
Central Line  Performed by: Steven Florence MD  Authorized by: Steven Florence MD     General Information and Staff    Procedure Start:  6/22/2022 5:22 PM  Procedure End:  6/22/2022 5:30 PM  Anesthesiologist:  Steven Florence MD  Performed by:   Anesthesiologist  Patient Location:  OR  Indication: central venous access and CVP monitoring    Site Identification: real time ultrasound guided        Procedure Detail    Patient Position:  Trendelenburg  Laterality:  Right  Site:  Internal jugular  Prep:  Chloraprep  Catheter Size:  9 Fr  Catheter Type:  MAC introducer  Number of Lumens:  Double lumen  Procedure Detail: target vein identified, needle advanced into vein and blood aspirated and guidewire advanced into vein    Seldinger Technique?: Yes    Intravenous Verification: verified by ultrasound and venous blood return    Post Insertion: all ports aspirated, all ports flushed easily, guidewire was removed intact, line was sutured in place and dressing was applied      Assessment    Events: patient tolerated procedure well with no complications      PA Catheter Placement    PA Catheter Placed?: Yes    PA Catheter Type:  Oximetric  PA Catheter Size:  8  Laterality:  Right  Site:  Internal jugular  Placement Confirmation: pressure tracing changes and verified by PATT    PA Catheter Depth (cm):  50  Events: patient tolerated procedure well with no complications      Additional Comments

## 2022-06-22 NOTE — PLAN OF CARE
Pt alert and oriented. Continuous tele monitoring in place. NSR on the monitor. Denies chest pain, SOB and dizziness. Echo completed. Awaiting cardiac cath. Safety and comfort needs met. Will continue to monitor. Problem: CARDIOVASCULAR - ADULT  Goal: Maintains optimal cardiac output and hemodynamic stability  Description: INTERVENTIONS:  - Monitor vital signs, rhythm, and trends  - Monitor for bleeding, hypotension and signs of decreased cardiac output  - Evaluate effectiveness of vasoactive medications to optimize hemodynamic stability  - Monitor arterial and/or venous puncture sites for bleeding and/or hematoma  - Assess quality of pulses, skin color and temperature  - Assess for signs of decreased coronary artery perfusion - ex.  Angina  - Evaluate fluid balance, assess for edema, trend weights  Outcome: Progressing  Goal: Absence of cardiac arrhythmias or at baseline  Description: INTERVENTIONS:  - Continuous cardiac monitoring, monitor vital signs, obtain 12 lead EKG if indicated  - Evaluate effectiveness of antiarrhythmic and heart rate control medications as ordered  - Initiate emergency measures for life threatening arrhythmias  - Monitor electrolytes and administer replacement therapy as ordered  Outcome: Progressing

## 2022-06-22 NOTE — ED QUICK NOTES
Orders for admission, patient is aware of plan and ready to go upstairs. Any questions, please call ED RN Ilan  at extension [phone]. Vaccinated? yes  Type of COVID test sent:rapid  COVID Suspicion level: Low      Titratable drug(s) infusing:none  Rate:    LOC at time of transport:a&ox4    Other pertinent information:    CIWA score=  NIH score=

## 2022-06-22 NOTE — PROGRESS NOTES
06/22/22 0430 06/22/22 0432 06/22/22 0434   Vital Signs   /63 103/66 100/67   MAP (mmHg) 74 75 75   BP Location Left arm Left arm Left arm   BP Method Automatic Automatic Automatic   Patient Position Lying Sitting Standing

## 2022-06-22 NOTE — ANESTHESIA PROCEDURE NOTES
Arterial Line  Performed by: Steven Florence MD  Authorized by: Steven Florence MD     General Information and Staff    Procedure Start:  6/22/2022 5:18 PM  Procedure End:  6/22/2022 5:21 PM  Anesthesiologist:  Steven Florence MD  Performed By:  Anesthesiologist  Patient Location:  OR  Indication: continuous blood pressure monitoring and blood sampling needed    Site Identification: real time ultrasound guided and surface landmarks    Preanesthetic Checklist: 2 patient identifiers, IV checked, risks and benefits discussed, monitors and equipment checked, pre-op evaluation, timeout performed, anesthesia consent and sterile technique used    Procedure Details    Catheter Size:  20 G  Catheter Length:  1 and 3/4 inchCatheter Type:  Arrow  Seldinger Technique?: Yes    Laterality:  LeftSite:  Radial artery  Site Prep: chlorhexidine  Line Secured:  Wrist Brace, tape and Tegaderm    Assessment    Events: patient tolerated procedure well with no complications      Medications      Additional Comments

## 2022-06-22 NOTE — PLAN OF CARE
Admitted from ED with chest pain   A&Ox4  O2 sat WNL on RA  SR on tele  Denies chest pain   Up ad jena    POC explained to family  Echo scheduled    Problem: Patient/Family Goals  Goal: Patient/Family Long Term Goal  Description: Patient's Long Term Goal: to stay out of the hospital     Interventions:  - to feel better  - understand poc   - follow up with PCP     - See additional Care Plan goals for specific interventions  Outcome: Progressing  Goal: Patient/Family Short Term Goal  Description: Patient's Short Term Goal: to feel better    Interventions:   - monitor on tele  - understand POC  - mds to see     - See additional Care Plan goals for specific interventions  Outcome: Progressing

## 2022-06-22 NOTE — ED INITIAL ASSESSMENT (HPI)
Pt presents to ed ambulatory with steady gait c/o left sided chest pain at a 8/10 that started 30minutes prior to arrival

## 2022-06-23 ENCOUNTER — APPOINTMENT (OUTPATIENT)
Dept: GENERAL RADIOLOGY | Facility: HOSPITAL | Age: 52
End: 2022-06-23
Attending: THORACIC SURGERY (CARDIOTHORACIC VASCULAR SURGERY)
Payer: COMMERCIAL

## 2022-06-23 LAB
ATRIAL RATE: 63 BPM
ATRIAL RATE: 68 BPM
ATRIAL RATE: 93 BPM
ATRIAL RATE: 99 BPM
BASE EXCESS BLDA CALC-SCNC: -1.1 MMOL/L (ref ?–2)
BASE EXCESS BLDA CALC-SCNC: 0.2 MMOL/L (ref ?–2)
BASOPHILS # BLD AUTO: 0.01 X10(3) UL (ref 0–0.2)
BASOPHILS NFR BLD AUTO: 0.1 %
BLOOD TYPE BARCODE: 6200
BODY TEMPERATURE: 98.6 F
BODY TEMPERATURE: 98.6 F
BUN BLD-MCNC: 8 MG/DL (ref 7–18)
CALCIUM BLD-MCNC: 8.3 MG/DL (ref 8.5–10.1)
CHLORIDE SERPL-SCNC: 114 MMOL/L (ref 98–112)
CHOLEST SERPL-MCNC: 186 MG/DL (ref ?–200)
CO2 SERPL-SCNC: 23 MMOL/L (ref 21–32)
COHGB MFR BLD: 1.1 % SAT (ref 0–3)
COHGB MFR BLD: 1.3 % SAT (ref 0–3)
CREAT BLD-MCNC: 0.75 MG/DL
EOSINOPHIL # BLD AUTO: 0 X10(3) UL (ref 0–0.7)
EOSINOPHIL NFR BLD AUTO: 0 %
ERYTHROCYTE [DISTWIDTH] IN BLOOD BY AUTOMATED COUNT: 11.9 %
FIO2: 40 %
FIO2: 40 %
GLUCOSE BLD-MCNC: 102 MG/DL (ref 70–99)
GLUCOSE BLD-MCNC: 104 MG/DL (ref 70–99)
GLUCOSE BLD-MCNC: 107 MG/DL (ref 70–99)
GLUCOSE BLD-MCNC: 109 MG/DL (ref 70–99)
GLUCOSE BLD-MCNC: 111 MG/DL (ref 70–99)
GLUCOSE BLD-MCNC: 111 MG/DL (ref 70–99)
GLUCOSE BLD-MCNC: 113 MG/DL (ref 70–99)
GLUCOSE BLD-MCNC: 114 MG/DL (ref 70–99)
GLUCOSE BLD-MCNC: 115 MG/DL (ref 70–99)
GLUCOSE BLD-MCNC: 118 MG/DL (ref 70–99)
GLUCOSE BLD-MCNC: 119 MG/DL (ref 70–99)
GLUCOSE BLD-MCNC: 122 MG/DL (ref 70–99)
GLUCOSE BLD-MCNC: 123 MG/DL (ref 70–99)
GLUCOSE BLD-MCNC: 129 MG/DL (ref 70–99)
GLUCOSE BLD-MCNC: 130 MG/DL (ref 70–99)
GLUCOSE BLD-MCNC: 142 MG/DL (ref 70–99)
GLUCOSE BLD-MCNC: 153 MG/DL (ref 70–99)
GLUCOSE BLD-MCNC: 155 MG/DL (ref 70–99)
GLUCOSE BLD-MCNC: 170 MG/DL (ref 70–99)
GLUCOSE BLD-MCNC: 176 MG/DL (ref 70–99)
GLUCOSE BLD-MCNC: 182 MG/DL (ref 70–99)
GLUCOSE BLD-MCNC: 90 MG/DL (ref 70–99)
GLUCOSE BLD-MCNC: 96 MG/DL (ref 70–99)
HCO3 BLDA-SCNC: 24.1 MEQ/L (ref 21–27)
HCO3 BLDA-SCNC: 25.1 MEQ/L (ref 21–27)
HCT VFR BLD AUTO: 40.1 %
HDLC SERPL-MCNC: 22 MG/DL (ref 40–59)
HGB BLD-MCNC: 11.9 G/DL
HGB BLD-MCNC: 13.4 G/DL
HGB BLD-MCNC: 14 G/DL
IMM GRANULOCYTES # BLD AUTO: 0.05 X10(3) UL (ref 0–1)
IMM GRANULOCYTES NFR BLD: 0.4 %
LDLC SERPL CALC-MCNC: 128 MG/DL (ref ?–100)
LYMPHOCYTES # BLD AUTO: 0.56 X10(3) UL (ref 1–4)
LYMPHOCYTES NFR BLD AUTO: 4.1 %
MAGNESIUM SERPL-MCNC: 1.8 MG/DL (ref 1.6–2.6)
MCH RBC QN AUTO: 30.4 PG (ref 26–34)
MCHC RBC AUTO-ENTMCNC: 34.9 G/DL (ref 31–37)
MCV RBC AUTO: 87.2 FL
METHGB MFR BLD: 0.4 % SAT (ref 0.4–1.5)
METHGB MFR BLD: 0.7 % SAT (ref 0.4–1.5)
MONOCYTES # BLD AUTO: 0.38 X10(3) UL (ref 0.1–1)
MONOCYTES NFR BLD AUTO: 2.8 %
NEUTROPHILS # BLD AUTO: 12.53 X10 (3) UL (ref 1.5–7.7)
NEUTROPHILS # BLD AUTO: 12.53 X10(3) UL (ref 1.5–7.7)
NEUTROPHILS NFR BLD AUTO: 92.6 %
NONHDLC SERPL-MCNC: 164 MG/DL (ref ?–130)
OXYHGB MFR BLDA: 97.3 % (ref 92–100)
OXYHGB MFR BLDA: 98.1 % (ref 92–100)
P AXIS: 1 DEGREES
P AXIS: 34 DEGREES
P AXIS: 56 DEGREES
P AXIS: 62 DEGREES
P-R INTERVAL: 152 MS
P-R INTERVAL: 162 MS
P-R INTERVAL: 174 MS
P-R INTERVAL: 176 MS
P/F RATIO: 298 MMHG
P/F RATIO: 363 MMHG
PCO2 BLDA: 32 MM HG (ref 35–45)
PCO2 BLDA: 34 MM HG (ref 35–45)
PEEP: 5 CM H2O
PEEP: 5 CM H2O
PH BLDA: 7.43 [PH] (ref 7.35–7.45)
PH BLDA: 7.47 [PH] (ref 7.35–7.45)
PLATELET # BLD AUTO: 107 10(3)UL (ref 150–450)
PO2 BLDA: 119 MM HG (ref 80–100)
PO2 BLDA: 145 MM HG (ref 80–100)
POTASSIUM SERPL-SCNC: 3.6 MMOL/L (ref 3.5–5.1)
Q-T INTERVAL: 352 MS
Q-T INTERVAL: 370 MS
Q-T INTERVAL: 378 MS
Q-T INTERVAL: 410 MS
QRS DURATION: 138 MS
QRS DURATION: 70 MS
QRS DURATION: 86 MS
QRS DURATION: 90 MS
QTC CALCULATION (BEZET): 386 MS
QTC CALCULATION (BEZET): 393 MS
QTC CALCULATION (BEZET): 451 MS
QTC CALCULATION (BEZET): 509 MS
R AXIS: 58 DEGREES
R AXIS: 61 DEGREES
R AXIS: 62 DEGREES
R AXIS: 67 DEGREES
RBC # BLD AUTO: 4.6 X10(6)UL
SODIUM SERPL-SCNC: 143 MMOL/L (ref 136–145)
T AXIS: -27 DEGREES
T AXIS: -31 DEGREES
T AXIS: 5 DEGREES
T AXIS: 6 DEGREES
TIDAL VOLUME: 500 ML
TIDAL VOLUME: 500 ML
TRIGL SERPL-MCNC: 199 MG/DL (ref 30–149)
VENT RATE: 14 /MIN
VENT RATE: 14 /MIN
VENTRICULAR RATE: 63 BPM
VENTRICULAR RATE: 68 BPM
VENTRICULAR RATE: 93 BPM
VENTRICULAR RATE: 99 BPM
VLDLC SERPL CALC-MCNC: 36 MG/DL (ref 0–30)
WBC # BLD AUTO: 13.5 X10(3) UL (ref 4–11)

## 2022-06-23 PROCEDURE — 71045 X-RAY EXAM CHEST 1 VIEW: CPT | Performed by: THORACIC SURGERY (CARDIOTHORACIC VASCULAR SURGERY)

## 2022-06-23 PROCEDURE — 99233 SBSQ HOSP IP/OBS HIGH 50: CPT | Performed by: HOSPITALIST

## 2022-06-23 RX ORDER — ATORVASTATIN CALCIUM 80 MG/1
80 TABLET, FILM COATED ORAL NIGHTLY
Status: DISCONTINUED | OUTPATIENT
Start: 2022-06-23 | End: 2022-06-28

## 2022-06-23 RX ORDER — ALBUMIN, HUMAN INJ 5% 5 %
500 SOLUTION INTRAVENOUS ONCE
Status: COMPLETED | OUTPATIENT
Start: 2022-06-23 | End: 2022-06-23

## 2022-06-23 RX ORDER — CEFAZOLIN SODIUM/WATER 2 G/20 ML
2 SYRINGE (ML) INTRAVENOUS EVERY 8 HOURS
Status: COMPLETED | OUTPATIENT
Start: 2022-06-23 | End: 2022-06-24

## 2022-06-23 RX ORDER — ACETAMINOPHEN 10 MG/ML
1000 INJECTION, SOLUTION INTRAVENOUS EVERY 6 HOURS
Status: DISCONTINUED | OUTPATIENT
Start: 2022-06-23 | End: 2022-06-25

## 2022-06-23 RX ORDER — DEXTROSE AND SODIUM CHLORIDE 5; .45 G/100ML; G/100ML
INJECTION, SOLUTION INTRAVENOUS CONTINUOUS
Status: DISCONTINUED | OUTPATIENT
Start: 2022-06-23 | End: 2022-06-26

## 2022-06-23 RX ORDER — ALBUMIN, HUMAN INJ 5% 5 %
SOLUTION INTRAVENOUS
Status: DISPENSED
Start: 2022-06-23 | End: 2022-06-23

## 2022-06-23 RX ORDER — ALBUMIN, HUMAN INJ 5% 5 %
250 SOLUTION INTRAVENOUS ONCE AS NEEDED
Status: COMPLETED | OUTPATIENT
Start: 2022-06-23 | End: 2022-06-23

## 2022-06-23 NOTE — PHYSICAL THERAPY NOTE
Physical theray consult requested, chart reviewed. Pt remains intubated and sedated POD #1 CABG x4. Will hold therapy evaluation today. Plan to re-attempt tomorrow.

## 2022-06-23 NOTE — PLAN OF CARE
Assumed care this morning. Dr Flynn Severe at bedside, update given/ orders received. Dobs restarted at low dose, albumin given. Plan to keep intubated, with IABP today. NSR on monitor, BP stable with dobs and IABP support. Remains on vent, sats >95%. Pt lightly sedated with propofol and precedex, appears comfortable and tolerating vent. Pt able to WHELAN, follow commands, opens eyes, nod to questions and even attempting to write this evening. Nods head \"no\"  to pain questions, IV tylenol and morphine given for pain control. Accucheck q1hour and adjusting insulin gtt per sliding scale. Family at bedside, questions encouraged and answered. Problem: Patient/Family Goals  Goal: Patient/Family Long Term Goal  Description: Patient's Long Term Goal: to stay out of the hospital     Interventions:  - to feel better  - understand poc   - follow up with PCP     - See additional Care Plan goals for specific interventions  Outcome: Progressing  Goal: Patient/Family Short Term Goal  Description: Patient's Short Term Goal: to feel better    Interventions:   - monitor on tele  - understand POC  - mds to see     - See additional Care Plan goals for specific interventions  Outcome: Progressing     Problem: CARDIOVASCULAR - ADULT  Goal: Maintains optimal cardiac output and hemodynamic stability  Description: INTERVENTIONS:  - Monitor vital signs, rhythm, and trends  - Monitor for bleeding, hypotension and signs of decreased cardiac output  - Evaluate effectiveness of vasoactive medications to optimize hemodynamic stability  - Monitor arterial and/or venous puncture sites for bleeding and/or hematoma  - Assess quality of pulses, skin color and temperature  - Assess for signs of decreased coronary artery perfusion - ex.  Angina  - Evaluate fluid balance, assess for edema, trend weights  Outcome: Progressing  Goal: Absence of cardiac arrhythmias or at baseline  Description: INTERVENTIONS:  - Continuous cardiac monitoring, monitor vital signs, obtain 12 lead EKG if indicated  - Evaluate effectiveness of antiarrhythmic and heart rate control medications as ordered  - Initiate emergency measures for life threatening arrhythmias  - Monitor electrolytes and administer replacement therapy as ordered  Outcome: Progressing     Problem: Safety Risk - Non-Violent Restraints  Goal: Patient will remain free from self-harm  Description: INTERVENTIONS:  - Apply the least restrictive restraint to prevent harm  - Notify patient and family of reasons restraints applied  - Assess for any contributing factors to confusion (electrolyte disturbances, delirium, medications)  - Discontinue any unnecessary medical devices as soon as possible  - Assess the patient's physical comfort, circulation, skin condition, hydration, nutrition and elimination needs   - Reorient and redirection as needed  - Assess for the need to continue restraints  Outcome: Progressing     Problem: Diabetes/Glucose Control  Goal: Glucose maintained within prescribed range  Description: INTERVENTIONS:  - Monitor Blood Glucose as ordered  - Assess for signs and symptoms of hyperglycemia and hypoglycemia  - Administer ordered medications to maintain glucose within target range  - Assess barriers to adequate nutritional intake and initiate nutrition consult as needed  - Instruct patient on self management of diabetes  Outcome: Progressing     Problem: METABOLIC/FLUID AND ELECTROLYTES - ADULT  Goal: Electrolytes maintained within normal limits  Description: INTERVENTIONS:  - Monitor labs and rhythm and assess patient for signs and symptoms of electrolyte imbalances  - Administer electrolyte replacement as ordered  - Monitor response to electrolyte replacements, including rhythm and repeat lab results as appropriate  - Fluid restriction as ordered  - Instruct patient on fluid and nutrition restrictions as appropriate  Outcome: Progressing  Goal: Hemodynamic stability and optimal renal function maintained  Description: INTERVENTIONS:  - Monitor labs and assess for signs and symptoms of volume excess or deficit  - Monitor intake, output and patient weight  - Monitor urine specific gravity, serum osmolarity and serum sodium as indicated or ordered  - Monitor response to interventions for patient's volume status, including labs, urine output, blood pressure (other measures as available)  - Encourage oral intake as appropriate  - Instruct patient on fluid and nutrition restrictions as appropriate  Outcome: Progressing     Problem: SKIN/TISSUE INTEGRITY - ADULT  Goal: Skin integrity remains intact  Description: INTERVENTIONS  - Assess and document risk factors for pressure ulcer development  - Assess and document skin integrity  - Monitor for areas of redness and/or skin breakdown  - Initiate interventions, skin care algorithm/standards of care as needed  Outcome: Progressing  Goal: Incision(s), wounds(s) or drain site(s) healing without S/S of infection  Description: INTERVENTIONS:  - Assess and document risk factors for pressure ulcer development  - Assess and document skin integrity  - Assess and document dressing/incision, wound bed, drain sites and surrounding tissue  - Implement wound care per orders  - Initiate isolation precautions as appropriate  - Initiate Pressure Ulcer prevention bundle as indicated  Outcome: Progressing

## 2022-06-23 NOTE — PROGRESS NOTES
Impression and Plan:    POD 1 status post CABG for NSTEMI  Patient seen by Dr. Trevizo Service  Intubated and sedated, on vent, wean off as tolerated  SBP running soft bedside 90's, HR 80's NSR, responding slowly to albumin/  Lines: Cordis, swan, A-line, IABP 1:1  Drains: CT, davalos  Drips: On  1 mcg, responded well, CI 2.1 <<1.7 (when off ), CO 4.1  Incision covered in dressing c/d/i   Slight leukocytosis expected 13.5, afebrile, PLT trending up 107 <<104, will monitor. No active signs or symptoms of bleeding.  Hgb stable 14, cr stable 0.75  Chest tube draining sanguineous fluid, overnight 100 cc, no air leaks on suction  Davalos draining 86 cc/hr overnight  All drains, lines, drips, wires to stay in place today  D/w Dr. Kinza Storey, PA-C  Cardiovascular and Thoracic Surgery

## 2022-06-23 NOTE — PLAN OF CARE
Received pt from Cath lab with IABP in place 1:1, neuro intact, no pain. see flow sheet for vitals and further assessment. Wife at bedside. Pt prepped for CABG per PA and MD at bedside. Pt consented, CVOR team stated they would finish prepping him downstairs for CHG bath and shave if needed. Dr. Yazan Campos at bedside, ultrasound not to bedside just yet. Hep gtt stopped by CVOR team before heading downstairs, pt had been NPO since arrival to unit. Sheila Rush updated with pt progress from bedside nurse and charge nurse.

## 2022-06-23 NOTE — OCCUPATIONAL THERAPY NOTE
Occupational theray consult requested, chart reviewed. Pt remains intubated and sedated POD #1 CABG x4. Will hold therapy evaluation today.   Plan to re-attempt tomorrow consult

## 2022-06-23 NOTE — DIETARY NOTE
Clinical Nutrition     Dietitian consult received per cardiac rehab standing order. Pt to be educated by cardiac rehab staff and encouraged to attend outpatient classes taught by [facility]. [facility] available PRN.     Zenda Denver RD, LDN, [address]   Clinical Dietitian  Phone [phone]  Pager [phone]

## 2022-06-23 NOTE — PLAN OF CARE
Received Shaq from [facility] team and assumed care @ approximately 2125: s/p CABG x4, vented and sedated w/propofol and dexmedetomidine gtt's; NSR on the monitor, epicardial pacemaker VVI in place, IABP 1:1 via right groin - site soft with no signs of bleeding, no oozing, nor hematoma; midsternal incision site drsg C/D/I, chest tubes patent placed to suction with minimal sanguinous output over night; Alston catheter w/good urine output. Dobutamine started at 2.5 mcg/kg/min per order for CI < 2, but titrated down and off quickly r/t resulting tachycardia and hypertension requiring low dose NTG for a very short time. Dobutamine restarted later in the shift at lower dose/rate to keep CI =/>2 and SBP >90. Per Wandy España and Al, plan to discontinue balloon pump and extubate sometime after shift change     Approximately 06:00 dobutamine turned off r/t hypertension. Shortly thereafter, hypotensive, CVP 10, CI <2; albumin given and low dose levophed started.     Please see flow-sheets for full assessments and/or additional information            Problem: Patient/Family Goals  Goal: Patient/Family Long Term Goal  Description: Patient's Long Term Goal: to stay out of the hospital     Interventions:  - to feel better  - understand poc   - follow up with PCP     - See additional Care Plan goals for specific interventions  Outcome: Progressing  Goal: Patient/Family Short Term Goal  Description: Patient's Short Term Goal: to feel better    Interventions:   - monitor on tele  - understand POC  - mds to see     - See additional Care Plan goals for specific interventions  Outcome: Progressing     Problem: CARDIOVASCULAR - ADULT  Goal: Maintains optimal cardiac output and hemodynamic stability  Description: INTERVENTIONS:  - Monitor vital signs, rhythm, and trends  - Monitor for bleeding, hypotension and signs of decreased cardiac output  - Evaluate effectiveness of vasoactive medications to optimize hemodynamic stability  - Monitor arterial and/or venous puncture sites for bleeding and/or hematoma  - Assess quality of pulses, skin color and temperature  - Assess for signs of decreased coronary artery perfusion - ex.  Angina  - Evaluate fluid balance, assess for edema, trend weights  Outcome: Progressing  Goal: Absence of cardiac arrhythmias or at baseline  Description: INTERVENTIONS:  - Continuous cardiac monitoring, monitor vital signs, obtain 12 lead EKG if indicated  - Evaluate effectiveness of antiarrhythmic and heart rate control medications as ordered  - Initiate emergency measures for life threatening arrhythmias  - Monitor electrolytes and administer replacement therapy as ordered  Outcome: Progressing     Problem: Safety Risk - Non-Violent Restraints  Goal: Patient will remain free from self-harm  Description: INTERVENTIONS:  - Apply the least restrictive restraint to prevent harm  - Notify patient and family of reasons restraints applied  - Assess for any contributing factors to confusion (electrolyte disturbances, delirium, medications)  - Discontinue any unnecessary medical devices as soon as possible  - Assess the patient's physical comfort, circulation, skin condition, hydration, nutrition and elimination needs   - Reorient and redirection as needed  - Assess for the need to continue restraints  Outcome: Progressing     Problem: Diabetes/Glucose Control  Goal: Glucose maintained within prescribed range  Description: INTERVENTIONS:  - Monitor Blood Glucose as ordered  - Assess for signs and symptoms of hyperglycemia and hypoglycemia  - Administer ordered medications to maintain glucose within target range  - Assess barriers to adequate nutritional intake and initiate nutrition consult as needed  - Instruct patient on self management of diabetes  Outcome: Progressing     Problem: METABOLIC/FLUID AND ELECTROLYTES - ADULT  Goal: Electrolytes maintained within normal limits  Description: INTERVENTIONS:  - Monitor labs and rhythm and assess patient for signs and symptoms of electrolyte imbalances  - Administer electrolyte replacement as ordered  - Monitor response to electrolyte replacements, including rhythm and repeat lab results as appropriate  - Fluid restriction as ordered  - Instruct patient on fluid and nutrition restrictions as appropriate  Outcome: Progressing  Goal: Hemodynamic stability and optimal renal function maintained  Description: INTERVENTIONS:  - Monitor labs and assess for signs and symptoms of volume excess or deficit  - Monitor intake, output and patient weight  - Monitor urine specific gravity, serum osmolarity and serum sodium as indicated or ordered  - Monitor response to interventions for patient's volume status, including labs, urine output, blood pressure (other measures as available)  - Encourage oral intake as appropriate  - Instruct patient on fluid and nutrition restrictions as appropriate  Outcome: Progressing     Problem: SKIN/TISSUE INTEGRITY - ADULT  Goal: Skin integrity remains intact  Description: INTERVENTIONS  - Assess and document risk factors for pressure ulcer development  - Assess and document skin integrity  - Monitor for areas of redness and/or skin breakdown  - Initiate interventions, skin care algorithm/standards of care as needed  Outcome: Progressing  Goal: Incision(s), wounds(s) or drain site(s) healing without S/S of infection  Description: INTERVENTIONS:  - Assess and document risk factors for pressure ulcer development  - Assess and document skin integrity  - Assess and document dressing/incision, wound bed, drain sites and surrounding tissue  - Implement wound care per orders  - Initiate isolation precautions as appropriate  - Initiate Pressure Ulcer prevention bundle as indicated  Outcome: Progressing

## 2022-06-23 NOTE — PROCEDURES
[facility]    PATIENT'S NAME: Andreas Jamison   ATTENDING PHYSICIAN: Alejandro Walsh MD   OPERATING PHYSICIAN: Ann Marie Colon M.D. PATIENT ACCOUNT#:   [de-identified]    LOCATION:  [address]  MEDICAL RECORD #:   [id number]       YOB: 1970  ADMISSION DATE:       06/22/2022      OPERATION DATE:  06/22/2022    CARDIAC PROCEDURE TRANSCRIPTION    CARDIAC CATHETERIZATION     PREOPERATIVE DIAGNOSIS:  1. Acute non-ST elevation myocardial infarction. 2.   Intermittent chest pain. 3.   Uncontrolled diabetes mellitus type 2 with hemoglobin A1c of 11.2%. 4.   Dyslipidemia. 5.   Active smoking. POSTOPERATIVE DIAGNOSIS:  1. Acute non-ST elevation myocardial infarction. 2.   Intermittent chest pain. 3.   Uncontrolled diabetes mellitus type 2 with hemoglobin A1c of 11.2%. 4.   Dyslipidemia. 5.   Active smoking. PROCEDURE PERFORMED:  1. Selective coronary angiography. 2.   Left ventriculogram.  3.   Selective left subclavian artery angiography. 4.   Successful intraaortic balloon pump placement through the right femoral artery. SEDATION:  We performed moderate conscious sedation with IV Versed and IV fentanyl. The time of first sedation dose was 1:01 p.m. Procedure ended at 2:04 p.m. Blood pressure, pulse ox, and heart rate were monitored all the time by the nurse and myself, and IV line was checked by the nurse and myself. DESCRIPTION OF PROCEDURE:  After written informed consent was obtained from the patient, he was brought to cardiac catheterization laboratory. He was prepped and draped in usual sterile fashion. Lidocaine 1% was used to infiltrate his right groin for local anesthesia, and 6-Luxembourger introducer sheath was inserted into right femoral artery using modified Seldinger technique.     Selective coronary angiography was performed using 6-Luxembourger FR4 diagnostic catheter for right coronary artery, and we tried 6-Luxembourger JL4 catheter for left coronary arterial system, but we had to change to 5-Saudi Arabian 3.5 JL catheter which was fitting more optimally in left main coronary artery takeoff. LV-gram was performed using 6-Saudi Arabian pigtail catheter. We measured pullback pressures across aortic valve and pigtail catheter was removed. We also performed left subclavian artery angiography, selective, in preparation for coronary bypass and we used a 6-Saudi Arabian JR4 diagnostic catheter. It was a subselective picture of left internal mammary artery with catheter inside the left subclavian artery. Right femoral arterial sheathogram was performed to assess femoral and iliac arterial system for balloon pump placement. There was no significant stenosis. J-wire was inserted into 6-Saudi Arabian introducer sheath which was removed over J-wire and we inserted 7. 5-Saudi Arabian balloon pump sheath into the right femoral artery using modified Seldinger technique. Subsequently, 40 mL 7. 5-Saudi Arabian intraaortic balloon pump, Maquet, was placed over the wire to the level of trachea bifurcation. It was secured to the skin with 2-0 silk and Tegaderm successfully. Intraaortic balloon pump was started at 1:1 ratio. We also started IV heparin bolus and drip per acute coronary syndrome protocol to cover acute coronary syndrome and balloon pump. Pullback pressures across the aortic valve were as follows:  LV cavity pressure 119/7/15 mmHg with central aortic pressure 127/76/98 mmHg with a heart rate in the 90s to low 100s. Patient was in sinus rhythm with PVCs and ventricular couplets but no prognostically significant arrhythmia. SELECTIVE CORONARY ANGIOGRAPHY FINDINGS:  Left main coronary artery had mild distal disease. LAD artery had subtotal ostial and very proximal LAD occlusion followed by 2 cm long 90% stenosis with mild to moderate disease in apical segment and BONG 1 flow in LAD system.     Major diagonal branch had diffuse proximal disease with chronic subtotal occlusion with distal diagonal segments filling from left-to-left collaterals and very proximal diagonal segment filling in antegrade fashion. Left circumflex artery had 80% proximal stenosis. Otherwise, there was no significant disease in circumflex and major OM system. Ramus intermedius had 80% proximal stenosis. There was a large bifurcating vessel with otherwise no significant disease. Right coronary artery was a dominant system with 99% distal stenosis and mild disease in proximal and mid segment. Right posterior descending artery and right posterolateral branch had mild disease angiographically. There were right-to-left collaterals from distal RCA through the septal branches to the entire LAD vessel. No thoracic or abdominal aorta aneurysm by angiography in catheterization lab. LV-gram revealed mildly reduced LV systolic function with LVEF of 45% to 50% with akinetic apex, but otherwise no significant wall motion abnormalities. No MR angiographically. No pressure gradient across the aortic valve. Subclavian artery had no disease angiographically and left internal mammary artery had no disease angiographically and was not crossing the sternum. IMPRESSION:  1.   Multivessel coronary artery disease as described above with subtotal ostial and very proximal LAD disease followed by 2 cm long 90% stenosis in proximal LAD and diffuse proximal major diagonal branch disease with chronic subtotal occlusion with left-to-left collaterals to distal major diagonal branch. 2.   An 80% proximal left circumflex stenosis with normal major OM branch angiographically. 3.   An 80% proximal ramus intermedius stenosis, large bifurcating vessel. 4.   Right coronary artery, a dominant system with 99% distal stenosis and right PDA with right PL branch with mild disease. 5.   Right-to-left collaterals from distal RCA through septal branches to entire LAD vessel. 6.   No thoracic or abdominal aorta aneurysm angiographically.   7. LV-gram revealed mildly reduced LV systolic function with LVEF of 45% to 50% with akinetic apex, but otherwise no significant wall motion abnormalities. 8. No MR angiographically. 9.   No pressure gradient across the aortic valve. 10.   Subclavian artery and left internal mammary artery normal angiographically and not crossing the sternum. 11.   Successful placement of 7. 5-Sudanese 40 mL intraaortic balloon pump to the right femoral artery. PLAN:  1.   CCU admit. 2.   CV Surgery consult to evaluate for CABG. 3.   Intraaortic balloon pump at ratio 1:1.  4.   We initiated IV heparin bolus followed by drip in the catheterization lab, per acute coronary syndrome protocol for acute coronary syndrome and for balloon pump. 5.   Start a low dose of beta blocker for ventricular ectopy. 6.   Start IV nitroglycerin drip at low rate if chest pain. 7.   Aspirin and statin initiated. 8.   IV fluids at 60 mL/h normal saline. The patient was given bolus 250 mL normal saline in catheterization lab. 9.   All discussed with the patient and his wife and the nursing staff in detail. 10.   Discussed with Dr. Ruth Meredith who referred the patient for cardiac surgery. 11.   Communicated with CV Surgery. Dictated By Alicia Fernandez M.D.  d: 06/22/2022 15:17:47  t: 06/22/2022 20:49:27  Job [id number]  DN/    cc: MD Alejandro Richards MD

## 2022-06-23 NOTE — PROGRESS NOTES
Anesthesia Ventilator Management    Patient is s/p CABG 4V  POD#0. Patient is currently sedated and intubated. Vent settings: PRVC 500/14/60%/5  ABG, CXR pending    Dex@ 0.5  Prop @ 25    Will wean FiO2 as tolerated.   Plan for extubation after IABP removal in AM.

## 2022-06-24 ENCOUNTER — APPOINTMENT (OUTPATIENT)
Dept: GENERAL RADIOLOGY | Facility: HOSPITAL | Age: 52
End: 2022-06-24
Attending: STUDENT IN AN ORGANIZED HEALTH CARE EDUCATION/TRAINING PROGRAM
Payer: COMMERCIAL

## 2022-06-24 LAB
ANION GAP SERPL CALC-SCNC: 5 MMOL/L (ref 0–18)
BASE EXCESS BLDA CALC-SCNC: -0.7 MMOL/L (ref ?–2)
BASOPHILS # BLD AUTO: 0.02 X10(3) UL (ref 0–0.2)
BASOPHILS NFR BLD AUTO: 0.2 %
BODY TEMPERATURE: 98.6 F
BUN BLD-MCNC: 6 MG/DL (ref 7–18)
CA-I BLD-SCNC: 1.17 MMOL/L (ref 0.95–1.32)
CALCIUM BLD-MCNC: 7.9 MG/DL (ref 8.5–10.1)
CHLORIDE SERPL-SCNC: 114 MMOL/L (ref 98–112)
CO2 SERPL-SCNC: 24 MMOL/L (ref 21–32)
COHGB MFR BLD: 1.2 % SAT (ref 0–3)
CREAT BLD-MCNC: 0.61 MG/DL
EOSINOPHIL # BLD AUTO: 0.01 X10(3) UL (ref 0–0.7)
EOSINOPHIL NFR BLD AUTO: 0.1 %
ERYTHROCYTE [DISTWIDTH] IN BLOOD BY AUTOMATED COUNT: 12.2 %
FIO2: 40 %
GLUCOSE BLD-MCNC: 103 MG/DL (ref 70–99)
GLUCOSE BLD-MCNC: 104 MG/DL (ref 70–99)
GLUCOSE BLD-MCNC: 108 MG/DL (ref 70–99)
GLUCOSE BLD-MCNC: 110 MG/DL (ref 70–99)
GLUCOSE BLD-MCNC: 111 MG/DL (ref 70–99)
GLUCOSE BLD-MCNC: 112 MG/DL (ref 70–99)
GLUCOSE BLD-MCNC: 113 MG/DL (ref 70–99)
GLUCOSE BLD-MCNC: 114 MG/DL (ref 70–99)
GLUCOSE BLD-MCNC: 115 MG/DL (ref 70–99)
GLUCOSE BLD-MCNC: 117 MG/DL (ref 70–99)
GLUCOSE BLD-MCNC: 118 MG/DL (ref 70–99)
GLUCOSE BLD-MCNC: 119 MG/DL (ref 70–99)
GLUCOSE BLD-MCNC: 121 MG/DL (ref 70–99)
GLUCOSE BLD-MCNC: 122 MG/DL (ref 70–99)
GLUCOSE BLD-MCNC: 122 MG/DL (ref 70–99)
GLUCOSE BLD-MCNC: 123 MG/DL (ref 70–99)
GLUCOSE BLD-MCNC: 125 MG/DL (ref 70–99)
GLUCOSE BLD-MCNC: 131 MG/DL (ref 70–99)
GLUCOSE BLD-MCNC: 96 MG/DL (ref 70–99)
GLUCOSE BLD-MCNC: 99 MG/DL (ref 70–99)
HCO3 BLDA-SCNC: 24.4 MEQ/L (ref 21–27)
HCT VFR BLD AUTO: 31.2 %
HGB BLD-MCNC: 10.9 G/DL
HGB BLD-MCNC: 11.6 G/DL
IMM GRANULOCYTES # BLD AUTO: 0.06 X10(3) UL (ref 0–1)
IMM GRANULOCYTES NFR BLD: 0.5 %
LACTATE BLD-SCNC: 0.8 MMOL/L (ref 0.5–2)
LYMPHOCYTES # BLD AUTO: 2.12 X10(3) UL (ref 1–4)
LYMPHOCYTES NFR BLD AUTO: 16.4 %
MAGNESIUM SERPL-MCNC: 1.8 MG/DL (ref 1.6–2.6)
MCH RBC QN AUTO: 30.5 PG (ref 26–34)
MCHC RBC AUTO-ENTMCNC: 34.9 G/DL (ref 31–37)
MCV RBC AUTO: 87.4 FL
METHGB MFR BLD: 0.6 % SAT (ref 0.4–1.5)
MONOCYTES # BLD AUTO: 0.97 X10(3) UL (ref 0.1–1)
MONOCYTES NFR BLD AUTO: 7.5 %
NEUTROPHILS # BLD AUTO: 9.77 X10 (3) UL (ref 1.5–7.7)
NEUTROPHILS # BLD AUTO: 9.77 X10(3) UL (ref 1.5–7.7)
NEUTROPHILS NFR BLD AUTO: 75.3 %
OSMOLALITY SERPL CALC.SUM OF ELEC: 294 MOSM/KG (ref 275–295)
OXYHGB MFR BLDA: 97.5 % (ref 92–100)
PCO2 BLDA: 35 MM HG (ref 35–45)
PEEP: 5 CM H2O
PH BLDA: 7.43 [PH] (ref 7.35–7.45)
PHOSPHATE SERPL-MCNC: 3.2 MG/DL (ref 2.5–4.9)
PLATELET # BLD AUTO: 76 10(3)UL (ref 150–450)
PO2 BLDA: 113 MM HG (ref 80–100)
POTASSIUM BLD-SCNC: 3.6 MMOL/L (ref 3.6–5.1)
POTASSIUM SERPL-SCNC: 3.7 MMOL/L (ref 3.5–5.1)
PRESSURE SUPPORT: 5 CM H2O
RBC # BLD AUTO: 3.57 X10(6)UL
SODIUM BLD-SCNC: 137 MMOL/L (ref 135–145)
SODIUM SERPL-SCNC: 143 MMOL/L (ref 136–145)
WBC # BLD AUTO: 13 X10(3) UL (ref 4–11)

## 2022-06-24 PROCEDURE — 99233 SBSQ HOSP IP/OBS HIGH 50: CPT | Performed by: HOSPITALIST

## 2022-06-24 PROCEDURE — 71045 X-RAY EXAM CHEST 1 VIEW: CPT | Performed by: STUDENT IN AN ORGANIZED HEALTH CARE EDUCATION/TRAINING PROGRAM

## 2022-06-24 RX ORDER — FLUTICASONE PROPIONATE 50 MCG
1 SPRAY, SUSPENSION (ML) NASAL 2 TIMES DAILY PRN
Status: DISCONTINUED | OUTPATIENT
Start: 2022-06-24 | End: 2022-06-24

## 2022-06-24 NOTE — PLAN OF CARE
Pt received intubated and lightly sedated with propofol and precedex gtt. Pt on low dose NTG and dobutamine. Insulin gtt. Pt with usual lines of cordis with SVO2 SG, CT, radial arterial line and davalos catheter. R groin IABP in place at 1:1 rate. R groin CDI with soft groin and palpable pedal pulse present. Received okay to proceed to remove IABP and wean to extubate today. Contacted Cardiology on instructions to wean IABP. Decreased rate Q30 min until removal.  IABP removed with manual pressure held for 20min. No complications following removal, groin soft and palpable pedal pulse. femstop applied for one hour following. Groin remains stable. After 4 hours post IABP removal, pt HOB raised to 30 degrees. Stopped sedation and coordinating with RT for breathing weaning trial to plan for extubation. Pt completed breathing trial.  ABG and parameters reported to anesthesia. Received orders for extubation. Pt to nasal canula. SG removed. CXR completed following extubation and SG removal.  Continue to follow insulin gtt protocol for dose adjustments. Plan to wean Dobutamine as BP allows. Family at bedside throughout the day, updated on plan of care. Will continue to monitor.

## 2022-06-24 NOTE — PROGRESS NOTES
Impression and Plan:  Post op 2 status post CABG for NSTEMI  Intubated and sedated, on vent FIO 40%  SBP better today 120's, HR 80's NSR  Maintaining CI: >2.1, CO 3.7 on dobutamine 1-1.5 mcq  Cordis, swan, A line, IABP 1:1, CT and davalos in place  Leukocytosis trending down, afebrile  Stable blood loss anemia 10.9  Thrombocytopenia PLT 76, likely consumptive, no signs or symptoms of bleeding. Monitor PLT  Cr stable 0.61, making good urine 62 cc/hr  Wounds are clean, dry and intact, no erythema, hematoma, swelling or discharge.   Hypoactive BS, chest clear anteriorly  Bed weight 3 kilo weight gain, no edema LE  ASA, statin, BB   Ancef surg ppx  Discontinue IABP, extubate and swan out, repeat CXR after   Keep A line, cordis, pacing wires, CT and davalos in place  D/w Dr. Gertrudis Crystal, PA-C  Cardiovascular and Thoracic Surgery

## 2022-06-24 NOTE — PHYSICAL THERAPY NOTE
Pt remains intubated, sedated, IABP in place. Will follow peripherally and plan to initiate physical therapy evaluation as clinically appropriate.

## 2022-06-24 NOTE — PROGRESS NOTES
06/24/22 1017   Clinical Encounter Type   Visited With Health care provider;Family   Routine Visit   (Responded to call)   Patient's Supportive Strategies/Resources Shaq's spouse and sister present   Patient Spiritual Encounters   Spiritual Assessment Completed   Valeta Severin is intubated at this time; however acknowledged and  for  listening presence and support)   Family Spiritual Encounters   Family Participation in [address] During Treatment Consistently   Taxonomy   Intended Effects Build relationship of care and support   Methods Encourage self care;Encourage self reflection;Encourage sharing of feelings;Encourage story-telling;Explore cultural values; Explore margaux and values; Offer emotional support;Setting boundaries   Interventions Active listening; Ask guided questions about cultural and Lutheran values;Assist with identifying strengths;Explain  role; Facilitate communication between patient and/or family member and care team;Identify supportive relationship(s); Provide compassionate touch; Share words of hope and inspiration    checked in w/ Shaq's RN regarding patient's condition/situation and family dynamics. Provided emotional and spiritual support to the spouse and sister. Acknowledged the current situation/condition. Processed their feelings. Explored cultural situation and clinical support and communication. Respected their margaux, values, cultural tradition and clinical practices. Encouraged self care for the family and communicated the need to focus on Shaq's recovery. The sister of the patient shared that she will be here only for \"couple of hours\" daytime, and the spouse added that she will go home to take care of herself after she talk to Umair Reaves regarding the tx plan. Respected their thoughts; and communicated it to our treatment team. They were all appreciative of listening presence and support.   remain available n Epic consult or at the pager # [phone].

## 2022-06-24 NOTE — PLAN OF CARE
Assumed care of Shaq@ approximately 1915: s/p CABG x4 POD #1, vented and sedated w/propofol and dexmedetomidine gtt's, but wakes and nods appropriately to conversations w/RN and per family is oriented to person, place and situation; NSR on the monitor CI > 2 most of the shift, dobutamine @ 1.5 mcg/kg/min and low dose NTG to keep SBP < 130, epicardial pacemaker VVI in place, IABP 1:1 via right groin - site soft with no signs of bleeding, no oozing, nor hematoma; midsternal incision site drsg C/D/I, chest tubes patent placed to suction with minimal sanguinous output over night; Alston catheter w/good urine output. Please see flow-sheets for full assessments and/or additional information        Problem: Patient/Family Goals  Goal: Patient/Family Long Term Goal  Description: Patient's Long Term Goal: to stay out of the hospital     Interventions:  - to feel better  - understand poc   - follow up with PCP     - See additional Care Plan goals for specific interventions  Outcome: Progressing  Goal: Patient/Family Short Term Goal  Description: Patient's Short Term Goal: to feel better    Interventions:   - monitor on tele  - understand POC  - mds to see     - See additional Care Plan goals for specific interventions  Outcome: Progressing     Problem: CARDIOVASCULAR - ADULT  Goal: Maintains optimal cardiac output and hemodynamic stability  Description: INTERVENTIONS:  - Monitor vital signs, rhythm, and trends  - Monitor for bleeding, hypotension and signs of decreased cardiac output  - Evaluate effectiveness of vasoactive medications to optimize hemodynamic stability  - Monitor arterial and/or venous puncture sites for bleeding and/or hematoma  - Assess quality of pulses, skin color and temperature  - Assess for signs of decreased coronary artery perfusion - ex.  Angina  - Evaluate fluid balance, assess for edema, trend weights  Outcome: Progressing  Goal: Absence of cardiac arrhythmias or at baseline  Description: INTERVENTIONS:  - Continuous cardiac monitoring, monitor vital signs, obtain 12 lead EKG if indicated  - Evaluate effectiveness of antiarrhythmic and heart rate control medications as ordered  - Initiate emergency measures for life threatening arrhythmias  - Monitor electrolytes and administer replacement therapy as ordered  Outcome: Progressing     Problem: Safety Risk - Non-Violent Restraints  Goal: Patient will remain free from self-harm  Description: INTERVENTIONS:  - Apply the least restrictive restraint to prevent harm  - Notify patient and family of reasons restraints applied  - Assess for any contributing factors to confusion (electrolyte disturbances, delirium, medications)  - Discontinue any unnecessary medical devices as soon as possible  - Assess the patient's physical comfort, circulation, skin condition, hydration, nutrition and elimination needs   - Reorient and redirection as needed  - Assess for the need to continue restraints  Outcome: Progressing     Problem: Diabetes/Glucose Control  Goal: Glucose maintained within prescribed range  Description: INTERVENTIONS:  - Monitor Blood Glucose as ordered  - Assess for signs and symptoms of hyperglycemia and hypoglycemia  - Administer ordered medications to maintain glucose within target range  - Assess barriers to adequate nutritional intake and initiate nutrition consult as needed  - Instruct patient on self management of diabetes  Outcome: Progressing     Problem: METABOLIC/FLUID AND ELECTROLYTES - ADULT  Goal: Electrolytes maintained within normal limits  Description: INTERVENTIONS:  - Monitor labs and rhythm and assess patient for signs and symptoms of electrolyte imbalances  - Administer electrolyte replacement as ordered  - Monitor response to electrolyte replacements, including rhythm and repeat lab results as appropriate  - Fluid restriction as ordered  - Instruct patient on fluid and nutrition restrictions as appropriate  Outcome: Progressing  Goal: Hemodynamic stability and optimal renal function maintained  Description: INTERVENTIONS:  - Monitor labs and assess for signs and symptoms of volume excess or deficit  - Monitor intake, output and patient weight  - Monitor urine specific gravity, serum osmolarity and serum sodium as indicated or ordered  - Monitor response to interventions for patient's volume status, including labs, urine output, blood pressure (other measures as available)  - Encourage oral intake as appropriate  - Instruct patient on fluid and nutrition restrictions as appropriate  Outcome: Progressing     Problem: SKIN/TISSUE INTEGRITY - ADULT  Goal: Skin integrity remains intact  Description: INTERVENTIONS  - Assess and document risk factors for pressure ulcer development  - Assess and document skin integrity  - Monitor for areas of redness and/or skin breakdown  - Initiate interventions, skin care algorithm/standards of care as needed  Outcome: Progressing  Goal: Incision(s), wounds(s) or drain site(s) healing without S/S of infection  Description: INTERVENTIONS:  - Assess and document risk factors for pressure ulcer development  - Assess and document skin integrity  - Assess and document dressing/incision, wound bed, drain sites and surrounding tissue  - Implement wound care per orders  - Initiate isolation precautions as appropriate  - Initiate Pressure Ulcer prevention bundle as indicated  Outcome: Progressing

## 2022-06-25 ENCOUNTER — APPOINTMENT (OUTPATIENT)
Dept: GENERAL RADIOLOGY | Facility: HOSPITAL | Age: 52
End: 2022-06-25
Attending: THORACIC SURGERY (CARDIOTHORACIC VASCULAR SURGERY)
Payer: COMMERCIAL

## 2022-06-25 LAB
GLUCOSE BLD-MCNC: 101 MG/DL (ref 70–99)
GLUCOSE BLD-MCNC: 104 MG/DL (ref 70–99)
GLUCOSE BLD-MCNC: 108 MG/DL (ref 70–99)
GLUCOSE BLD-MCNC: 109 MG/DL (ref 70–99)
GLUCOSE BLD-MCNC: 113 MG/DL (ref 70–99)
GLUCOSE BLD-MCNC: 113 MG/DL (ref 70–99)
GLUCOSE BLD-MCNC: 118 MG/DL (ref 70–99)
GLUCOSE BLD-MCNC: 123 MG/DL (ref 70–99)
GLUCOSE BLD-MCNC: 137 MG/DL (ref 70–99)
GLUCOSE BLD-MCNC: 443 MG/DL (ref 70–99)
GLUCOSE BLD-MCNC: 78 MG/DL (ref 70–99)
GLUCOSE BLD-MCNC: 96 MG/DL (ref 70–99)

## 2022-06-25 PROCEDURE — 99233 SBSQ HOSP IP/OBS HIGH 50: CPT | Performed by: HOSPITALIST

## 2022-06-25 PROCEDURE — 71045 X-RAY EXAM CHEST 1 VIEW: CPT | Performed by: THORACIC SURGERY (CARDIOTHORACIC VASCULAR SURGERY)

## 2022-06-25 RX ORDER — HEPARIN SODIUM 5000 [USP'U]/ML
5000 INJECTION, SOLUTION INTRAVENOUS; SUBCUTANEOUS EVERY 8 HOURS SCHEDULED
Status: DISCONTINUED | OUTPATIENT
Start: 2022-06-25 | End: 2022-06-27

## 2022-06-25 RX ORDER — ALPRAZOLAM 0.25 MG/1
0.25 TABLET ORAL NIGHTLY PRN
Status: DISCONTINUED | OUTPATIENT
Start: 2022-06-25 | End: 2022-06-26

## 2022-06-25 RX ORDER — CALCIUM CARBONATE 200(500)MG
500 TABLET,CHEWABLE ORAL 3 TIMES DAILY PRN
Status: DISCONTINUED | OUTPATIENT
Start: 2022-06-25 | End: 2022-06-28

## 2022-06-25 RX ORDER — ACETAMINOPHEN 500 MG
500 TABLET ORAL EVERY 4 HOURS PRN
Status: DISCONTINUED | OUTPATIENT
Start: 2022-06-25 | End: 2022-06-28

## 2022-06-25 NOTE — PLAN OF CARE
Patient Aox4. Received up in chair at beginning of shift. Ambulate with rehab then back to chair. Up to commode multiple times with gas, No BM. Returned to bed and chest tubes removed without incident. CXR post removal done. Cordis and davalos remain in place per surgery. See MAR for pain management. Problem: Patient/Family Goals  Goal: Patient/Family Long Term Goal  Description: Patient's Long Term Goal: disease management    Interventions:  - follow up care with Mds  - cardiac rehab  - medication management    - See additional Care Plan goals for specific interventions  Outcome: Progressing  Goal: Patient/Family Short Term Goal  Description: Patient's Short Term Goal: Discharge from hospital    Interventions:   - Medication management  - Pain management  - Participate in ADLS to return to baseline function    - See additional Care Plan goals for specific interventions  Outcome: Progressing     Problem: Diabetes/Glucose Control  Goal: Glucose maintained within prescribed range  Description: INTERVENTIONS:  - Monitor Blood Glucose as ordered  - Assess for signs and symptoms of hyperglycemia and hypoglycemia  - Administer ordered medications to maintain glucose within target range  - Assess barriers to adequate nutritional intake and initiate nutrition consult as needed  - Instruct patient on self management of diabetes  Outcome: Progressing     Problem: CARDIOVASCULAR - ADULT  Goal: Maintains optimal cardiac output and hemodynamic stability  Description: INTERVENTIONS:  - Monitor vital signs, rhythm, and trends  - Monitor for bleeding, hypotension and signs of decreased cardiac output  - Evaluate effectiveness of vasoactive medications to optimize hemodynamic stability  - Monitor arterial and/or venous puncture sites for bleeding and/or hematoma  - Assess quality of pulses, skin color and temperature  - Assess for signs of decreased coronary artery perfusion - ex.  Angina  - Evaluate fluid balance, assess for edema, trend weights  Outcome: Progressing  Goal: Absence of cardiac arrhythmias or at baseline  Description: INTERVENTIONS:  - Continuous cardiac monitoring, monitor vital signs, obtain 12 lead EKG if indicated  - Evaluate effectiveness of antiarrhythmic and heart rate control medications as ordered  - Initiate emergency measures for life threatening arrhythmias  - Monitor electrolytes and administer replacement therapy as ordered  Outcome: Progressing

## 2022-06-25 NOTE — PLAN OF CARE
Assumed patient care around 1600- Patient with intermittent periods of high heart rate as fast as 150. Patients rhythm immediately comes back down, unable to get 12 lead EKG. Patient states he is anxious, but feeling better. Cardiology made aware- po metoprolol given early. Will continue to monitor patient. Problem: CARDIOVASCULAR - ADULT  Goal: Maintains optimal cardiac output and hemodynamic stability  Description: INTERVENTIONS:  - Monitor vital signs, rhythm, and trends  - Monitor for bleeding, hypotension and signs of decreased cardiac output  - Evaluate effectiveness of vasoactive medications to optimize hemodynamic stability  - Monitor arterial and/or venous puncture sites for bleeding and/or hematoma  - Assess quality of pulses, skin color and temperature  - Assess for signs of decreased coronary artery perfusion - ex.  Angina  - Evaluate fluid balance, assess for edema, trend weights  Outcome: Progressing  Goal: Absence of cardiac arrhythmias or at baseline  Description: INTERVENTIONS:  - Continuous cardiac monitoring, monitor vital signs, obtain 12 lead EKG if indicated  - Evaluate effectiveness of antiarrhythmic and heart rate control medications as ordered  - Initiate emergency measures for life threatening arrhythmias  - Monitor electrolytes and administer replacement therapy as ordered  Outcome: Progressing

## 2022-06-25 NOTE — PLAN OF CARE
Assumed care at [address]. Wife at bedside will stay the night. Remains on insulin gtt, taking sips of water. Dobutamine gtt weaned off at 2145. CT in place 10-20cc hourly output. Adequate urine output via davalos. Medicated with ofirmev and morphine for discomfort. Up to chair this morning tolerated well.

## 2022-06-26 LAB
ALBUMIN SERPL-MCNC: 2.6 G/DL (ref 3.4–5)
ALBUMIN/GLOB SERPL: 1 {RATIO} (ref 1–2)
ALP LIVER SERPL-CCNC: 72 U/L
ALT SERPL-CCNC: 12 U/L
ANION GAP SERPL CALC-SCNC: 6 MMOL/L (ref 0–18)
AST SERPL-CCNC: 12 U/L (ref 15–37)
BILIRUB SERPL-MCNC: 1.7 MG/DL (ref 0.1–2)
BUN BLD-MCNC: 10 MG/DL (ref 7–18)
CALCIUM BLD-MCNC: 8.3 MG/DL (ref 8.5–10.1)
CHLORIDE SERPL-SCNC: 106 MMOL/L (ref 98–112)
CO2 SERPL-SCNC: 27 MMOL/L (ref 21–32)
CREAT BLD-MCNC: 0.6 MG/DL
ERYTHROCYTE [DISTWIDTH] IN BLOOD BY AUTOMATED COUNT: 12.3 %
GLOBULIN PLAS-MCNC: 2.5 G/DL (ref 2.8–4.4)
GLUCOSE BLD-MCNC: 101 MG/DL (ref 70–99)
GLUCOSE BLD-MCNC: 116 MG/DL (ref 70–99)
GLUCOSE BLD-MCNC: 161 MG/DL (ref 70–99)
GLUCOSE BLD-MCNC: 189 MG/DL (ref 70–99)
GLUCOSE BLD-MCNC: 269 MG/DL (ref 70–99)
GLUCOSE BLD-MCNC: 275 MG/DL (ref 70–99)
HCT VFR BLD AUTO: 30.8 %
HGB BLD-MCNC: 10.2 G/DL
MCH RBC QN AUTO: 30.4 PG (ref 26–34)
MCHC RBC AUTO-ENTMCNC: 33.1 G/DL (ref 31–37)
MCV RBC AUTO: 91.7 FL
OSMOLALITY SERPL CALC.SUM OF ELEC: 287 MOSM/KG (ref 275–295)
PLATELET # BLD AUTO: 98 10(3)UL (ref 150–450)
POTASSIUM SERPL-SCNC: 3.6 MMOL/L (ref 3.5–5.1)
POTASSIUM SERPL-SCNC: 4.1 MMOL/L (ref 3.5–5.1)
PROT SERPL-MCNC: 5.1 G/DL (ref 6.4–8.2)
RBC # BLD AUTO: 3.36 X10(6)UL
SODIUM SERPL-SCNC: 139 MMOL/L (ref 136–145)
WBC # BLD AUTO: 9.2 X10(3) UL (ref 4–11)

## 2022-06-26 PROCEDURE — 99232 SBSQ HOSP IP/OBS MODERATE 35: CPT | Performed by: HOSPITALIST

## 2022-06-26 RX ORDER — POTASSIUM CHLORIDE 20 MEQ/1
40 TABLET, EXTENDED RELEASE ORAL EVERY 4 HOURS
Status: COMPLETED | OUTPATIENT
Start: 2022-06-26 | End: 2022-06-26

## 2022-06-26 RX ORDER — ALPRAZOLAM 0.25 MG/1
0.25 TABLET ORAL 3 TIMES DAILY PRN
Status: DISCONTINUED | OUTPATIENT
Start: 2022-06-26 | End: 2022-06-28

## 2022-06-26 NOTE — PLAN OF CARE
Pt. Feeling anxious this evening. I inquired if he takes anything at home to relax and he stated several years ago he used to take xanax. Hospitalist notified and order obtained for nightly PRN dose. Surgical pain rated 2-3/10. Medicated with norco for surgical discomfort. 2L NC in use with sleep as sats 88%, with oxygen upper 90s. Encouraged I.S. use, able to reach 1L now. Introducer and davalos remain in place.

## 2022-06-26 NOTE — PLAN OF CARE
Problem: Patient/Family Goals  Goal: Patient/Family Long Term Goal  Description: Patient's Long Term Goal: disease management    Interventions:  - follow up care with Mds  - cardiac rehab  - medication management    - See additional Care Plan goals for specific interventions  Outcome: Progressing  Goal: Patient/Family Short Term Goal  Description: Patient's Short Term Goal: Discharge from hospital    Interventions:   - Medication management  - Pain management  - Participate in ADLS to return to baseline function    - See additional Care Plan goals for specific interventions  Outcome: Progressing     Problem: CARDIOVASCULAR - ADULT  Goal: Maintains optimal cardiac output and hemodynamic stability  Description: INTERVENTIONS:  - Monitor vital signs, rhythm, and trends  - Monitor for bleeding, hypotension and signs of decreased cardiac output  - Evaluate effectiveness of vasoactive medications to optimize hemodynamic stability  - Monitor arterial and/or venous puncture sites for bleeding and/or hematoma  - Assess quality of pulses, skin color and temperature  - Assess for signs of decreased coronary artery perfusion - ex.  Angina  - Evaluate fluid balance, assess for edema, trend weights  Outcome: Progressing  Goal: Absence of cardiac arrhythmias or at baseline  Description: INTERVENTIONS:  - Continuous cardiac monitoring, monitor vital signs, obtain 12 lead EKG if indicated  - Evaluate effectiveness of antiarrhythmic and heart rate control medications as ordered  - Initiate emergency measures for life threatening arrhythmias  - Monitor electrolytes and administer replacement therapy as ordered  Outcome: Progressing     Problem: Safety Risk - Non-Violent Restraints  Goal: Patient will remain free from self-harm  Description: INTERVENTIONS:  - Apply the least restrictive restraint to prevent harm  - Notify patient and family of reasons restraints applied  - Assess for any contributing factors to confusion (electrolyte disturbances, delirium, medications)  - Discontinue any unnecessary medical devices as soon as possible  - Assess the patient's physical comfort, circulation, skin condition, hydration, nutrition and elimination needs   - Reorient and redirection as needed  - Assess for the need to continue restraints  Outcome: Completed     Problem: Diabetes/Glucose Control  Goal: Glucose maintained within prescribed range  Description: INTERVENTIONS:  - Monitor Blood Glucose as ordered  - Assess for signs and symptoms of hyperglycemia and hypoglycemia  - Administer ordered medications to maintain glucose within target range  - Assess barriers to adequate nutritional intake and initiate nutrition consult as needed  - Instruct patient on self management of diabetes  Outcome: Progressing     Problem: METABOLIC/FLUID AND ELECTROLYTES - ADULT  Goal: Electrolytes maintained within normal limits  Description: INTERVENTIONS:  - Monitor labs and rhythm and assess patient for signs and symptoms of electrolyte imbalances  - Administer electrolyte replacement as ordered  - Monitor response to electrolyte replacements, including rhythm and repeat lab results as appropriate  - Fluid restriction as ordered  - Instruct patient on fluid and nutrition restrictions as appropriate  Outcome: Progressing  Goal: Hemodynamic stability and optimal renal function maintained  Description: INTERVENTIONS:  - Monitor labs and assess for signs and symptoms of volume excess or deficit  - Monitor intake, output and patient weight  - Monitor urine specific gravity, serum osmolarity and serum sodium as indicated or ordered  - Monitor response to interventions for patient's volume status, including labs, urine output, blood pressure (other measures as available)  - Encourage oral intake as appropriate  - Instruct patient on fluid and nutrition restrictions as appropriate  Outcome: Progressing     Problem: SKIN/TISSUE INTEGRITY - ADULT  Goal: Skin integrity remains intact  Description: INTERVENTIONS  - Assess and document risk factors for pressure ulcer development  - Assess and document skin integrity  - Monitor for areas of redness and/or skin breakdown  - Initiate interventions, skin care algorithm/standards of care as needed  Outcome: Progressing  Goal: Incision(s), wounds(s) or drain site(s) healing without S/S of infection  Description: INTERVENTIONS:  - Assess and document risk factors for pressure ulcer development  - Assess and document skin integrity  - Assess and document dressing/incision, wound bed, drain sites and surrounding tissue  - Implement wound care per orders  - Initiate isolation precautions as appropriate  - Initiate Pressure Ulcer prevention bundle as indicated  Outcome: Progressing

## 2022-06-26 NOTE — PROGRESS NOTES
Shift Note:  Assumed care of patient of 1620. POD 4 from CABG x4. Patient alert and oriented x4, glasses at bedside. Patient on room air, denies difficulty breathing, lung sounds diminished with mild crackles on bilateral lung bases. Denies any cardiac symptoms, NSR on tele. Denies pain at this time. Continent of bowel and bladder, last BM 6/26. Ambulates with stand by assistance, call light within reach, tolerating care well. Sternal incision and 3 donor sites on left left intact with no drainage.     POC:  - Postop CABG care  - Pain management   - 3x walks a day   - Daily weight

## 2022-06-27 LAB
ERYTHROCYTE [DISTWIDTH] IN BLOOD BY AUTOMATED COUNT: 11.9 %
GLUCOSE BLD-MCNC: 131 MG/DL (ref 70–99)
GLUCOSE BLD-MCNC: 159 MG/DL (ref 70–99)
GLUCOSE BLD-MCNC: 208 MG/DL (ref 70–99)
GLUCOSE BLD-MCNC: 231 MG/DL (ref 70–99)
HCT VFR BLD AUTO: 36.2 %
HGB BLD-MCNC: 12 G/DL
MCH RBC QN AUTO: 30.2 PG (ref 26–34)
MCHC RBC AUTO-ENTMCNC: 33.1 G/DL (ref 31–37)
MCV RBC AUTO: 91.2 FL
PLATELET # BLD AUTO: 166 10(3)UL (ref 150–450)
RBC # BLD AUTO: 3.97 X10(6)UL
WBC # BLD AUTO: 8.1 X10(3) UL (ref 4–11)

## 2022-06-27 PROCEDURE — 99232 SBSQ HOSP IP/OBS MODERATE 35: CPT | Performed by: HOSPITALIST

## 2022-06-27 RX ORDER — METOPROLOL TARTRATE 50 MG/1
50 TABLET, FILM COATED ORAL
Status: DISCONTINUED | OUTPATIENT
Start: 2022-06-27 | End: 2022-06-28

## 2022-06-27 RX ORDER — BLOOD SUGAR DIAGNOSTIC
STRIP MISCELLANEOUS
Qty: 100 STRIP | Refills: 2 | Status: SHIPPED | OUTPATIENT
Start: 2022-06-27

## 2022-06-27 RX ORDER — BLOOD-GLUCOSE METER
EACH MISCELLANEOUS
Qty: 1 KIT | Refills: 0 | Status: SHIPPED | OUTPATIENT
Start: 2022-06-27 | End: 2022-06-30 | Stop reason: CLARIF

## 2022-06-27 NOTE — PLAN OF CARE
A&Ox4  O2 sat WNL on RA.    Achieving 1000 on IS  SR on tele  Pacer wires in place, covered with pressure tape  Continent of bladder and bowel   Up with SBA  Tolerating walking in hallway  Norco given for pain control     Problem: Patient/Family Goals  Goal: Patient/Family Long Term Goal  Description: Patient's Long Term Goal: disease management    Interventions:  - follow up care with Mds  - cardiac rehab  - medication management    - See additional Care Plan goals for specific interventions  Outcome: Progressing  Goal: Patient/Family Short Term Goal  Description: Patient's Short Term Goal: Discharge from hospital    Interventions:   - Medication management  - Pain management  - Participate in ADLS to return to baseline function    - See additional Care Plan goals for specific interventions  Outcome: Progressing     Problem: CARDIOVASCULAR - ADULT  Goal: Maintains optimal cardiac output and hemodynamic stability  Description: INTERVENTIONS:  - Monitor vital signs, rhythm, and trends  - Monitor for bleeding, hypotension and signs of decreased cardiac output  - Evaluate effectiveness of vasoactive medications to optimize hemodynamic stability  - Monitor arterial and/or venous puncture sites for bleeding and/or hematoma  - Assess quality of pulses, skin color and temperature  - Assess for signs of decreased coronary artery perfusion - ex.  Angina  - Evaluate fluid balance, assess for edema, trend weights  Outcome: Progressing  Goal: Absence of cardiac arrhythmias or at baseline  Description: INTERVENTIONS:  - Continuous cardiac monitoring, monitor vital signs, obtain 12 lead EKG if indicated  - Evaluate effectiveness of antiarrhythmic and heart rate control medications as ordered  - Initiate emergency measures for life threatening arrhythmias  - Monitor electrolytes and administer replacement therapy as ordered  Outcome: Progressing     Problem: Diabetes/Glucose Control  Goal: Glucose maintained within prescribed range  Description: INTERVENTIONS:  - Monitor Blood Glucose as ordered  - Assess for signs and symptoms of hyperglycemia and hypoglycemia  - Administer ordered medications to maintain glucose within target range  - Assess barriers to adequate nutritional intake and initiate nutrition consult as needed  - Instruct patient on self management of diabetes  Outcome: Progressing     Problem: METABOLIC/FLUID AND ELECTROLYTES - ADULT  Goal: Electrolytes maintained within normal limits  Description: INTERVENTIONS:  - Monitor labs and rhythm and assess patient for signs and symptoms of electrolyte imbalances  - Administer electrolyte replacement as ordered  - Monitor response to electrolyte replacements, including rhythm and repeat lab results as appropriate  - Fluid restriction as ordered  - Instruct patient on fluid and nutrition restrictions as appropriate  Outcome: Progressing  Goal: Hemodynamic stability and optimal renal function maintained  Description: INTERVENTIONS:  - Monitor labs and assess for signs and symptoms of volume excess or deficit  - Monitor intake, output and patient weight  - Monitor urine specific gravity, serum osmolarity and serum sodium as indicated or ordered  - Monitor response to interventions for patient's volume status, including labs, urine output, blood pressure (other measures as available)  - Encourage oral intake as appropriate  - Instruct patient on fluid and nutrition restrictions as appropriate  Outcome: Progressing     Problem: SKIN/TISSUE INTEGRITY - ADULT  Goal: Skin integrity remains intact  Description: INTERVENTIONS  - Assess and document risk factors for pressure ulcer development  - Assess and document skin integrity  - Monitor for areas of redness and/or skin breakdown  - Initiate interventions, skin care algorithm/standards of care as needed  Outcome: Progressing  Goal: Incision(s), wounds(s) or drain site(s) healing without S/S of infection  Description: INTERVENTIONS:  - Assess and document risk factors for pressure ulcer development  - Assess and document skin integrity  - Assess and document dressing/incision, wound bed, drain sites and surrounding tissue  - Implement wound care per orders  - Initiate isolation precautions as appropriate  - Initiate Pressure Ulcer prevention bundle as indicated  Outcome: Progressing

## 2022-06-27 NOTE — PROGRESS NOTES
Seen and examined by Dr. [place] Hence  POD #5 s/p CABG  Doing well, pain adequately managed, just did stairs, wife and patient nervous to go home. Discontinue subcutaneous heparin, d/c pacer wires in am, plan discharge tomorrow.   Madison Oliva RN  Clinical Coordinator  CV Surgery

## 2022-06-27 NOTE — CM/SW NOTE
[facility] referrals in [place].  Await acceptances to provide choice list.     TIKA Hurley, [facility]  Discharge Planner  [id number]

## 2022-06-27 NOTE — PLAN OF CARE
Pod 5 s/p CABG  incisional pain relief w/ prn norco  Did stairs w/ physical therapist  Patient watched video re diabetes verbalized understanding  Did own insulin shot w/ supervision good technique  Accuchecks qid  Seen by julieta alvarez  Heparin subcutaneous  discontinued by surgery  To remove pacer wires and dressings 1st thing in am-6/28/22 as per surgery orders  Increase activity ,Supervise w/ IS chair q meals    Problem: CARDIOVASCULAR - ADULT  Goal: Maintains optimal cardiac output and hemodynamic stability  Description: INTERVENTIONS:  - Monitor vital signs, rhythm, and trends  - Monitor for bleeding, hypotension and signs of decreased cardiac output  - Evaluate effectiveness of vasoactive medications to optimize hemodynamic stability  - Monitor arterial and/or venous puncture sites for bleeding and/or hematoma  - Assess quality of pulses, skin color and temperature  - Assess for signs of decreased coronary artery perfusion - ex.  Angina  - Evaluate fluid balance, assess for edema, trend weights  Outcome: Progressing     Problem: CARDIOVASCULAR - ADULT  Goal: Absence of cardiac arrhythmias or at baseline  Description: INTERVENTIONS:  - Continuous cardiac monitoring, monitor vital signs, obtain 12 lead EKG if indicated  - Evaluate effectiveness of antiarrhythmic and heart rate control medications as ordered  - Initiate emergency measures for life threatening arrhythmias  - Monitor electrolytes and administer replacement therapy as ordered  Outcome: Progressing     Problem: SKIN/TISSUE INTEGRITY - ADULT  Goal: Skin integrity remains intact  Description: INTERVENTIONS  - Assess and document risk factors for pressure ulcer development  - Assess and document skin integrity  - Monitor for areas of redness and/or skin breakdown  - Initiate interventions, skin care algorithm/standards of care as needed  Outcome: Progressing     Problem: Diabetes/Glucose Control  Goal: Glucose maintained within prescribed range  Description: INTERVENTIONS:  - Monitor Blood Glucose as ordered  - Assess for signs and symptoms of hyperglycemia and hypoglycemia  - Administer ordered medications to maintain glucose within target range  - Assess barriers to adequate nutritional intake and initiate nutrition consult as needed  - Instruct patient on self management of diabetes  Outcome: Progressing

## 2022-06-27 NOTE — CARDIAC REHAB
Cardiac rehab education completed with patient and spouse. Phase 2 appointment scheduled. Smoking cessation addressed.

## 2022-06-27 NOTE — CM/SW NOTE
Voicemail message left w/ Marita at [facility] to inquire about what home health agencies are accepted within pt's insurance. Requested call back.      [phone] x [phone]    TIKA Wills, [facility]  Discharge [address] [facility].

## 2022-06-27 NOTE — CM/SW NOTE
Please contact Marita from [facility] # [phone]  for discharge planning.     ANNETTE Kaminski RN, CM

## 2022-06-28 ENCOUNTER — EXTERNAL RECORD (OUTPATIENT)
Dept: HEALTH INFORMATION MANAGEMENT | Facility: OTHER | Age: 52
End: 2022-06-28

## 2022-06-28 VITALS
DIASTOLIC BLOOD PRESSURE: 73 MMHG | BODY MASS INDEX: 19.27 KG/M2 | OXYGEN SATURATION: 96 % | TEMPERATURE: 98 F | HEART RATE: 75 BPM | SYSTOLIC BLOOD PRESSURE: 95 MMHG | WEIGHT: 134.63 LBS | RESPIRATION RATE: 16 BRPM | HEIGHT: 70 IN

## 2022-06-28 LAB
GLUCOSE BLD-MCNC: 151 MG/DL (ref 70–99)
GLUCOSE BLD-MCNC: 195 MG/DL (ref 70–99)

## 2022-06-28 PROCEDURE — 99232 SBSQ HOSP IP/OBS MODERATE 35: CPT | Performed by: CLINICAL NURSE SPECIALIST

## 2022-06-28 PROCEDURE — 99239 HOSP IP/OBS DSCHRG MGMT >30: CPT | Performed by: HOSPITALIST

## 2022-06-28 RX ORDER — ATORVASTATIN CALCIUM 80 MG/1
80 TABLET, FILM COATED ORAL NIGHTLY
Qty: 30 TABLET | Refills: 3 | Status: SHIPPED | OUTPATIENT
Start: 2022-06-28

## 2022-06-28 RX ORDER — PEN NEEDLE, DIABETIC 32GX 5/32"
NEEDLE, DISPOSABLE MISCELLANEOUS
Qty: 100 EACH | Refills: 6 | Status: SHIPPED | OUTPATIENT
Start: 2022-06-28 | End: 2022-06-30 | Stop reason: CLARIF

## 2022-06-28 RX ORDER — METOPROLOL TARTRATE 50 MG/1
50 TABLET, FILM COATED ORAL
Qty: 60 TABLET | Refills: 3 | Status: SHIPPED | OUTPATIENT
Start: 2022-06-28

## 2022-06-28 RX ORDER — INSULIN ASPART 100 [IU]/ML
INJECTION, SOLUTION INTRAVENOUS; SUBCUTANEOUS
Qty: 5 EACH | Refills: 0 | Status: SHIPPED | OUTPATIENT
Start: 2022-06-28 | End: 2022-06-30 | Stop reason: CLARIF

## 2022-06-28 RX ORDER — INSULIN DETEMIR 100 [IU]/ML
8 INJECTION, SOLUTION SUBCUTANEOUS EVERY 12 HOURS
Qty: 5 EACH | Refills: 0 | Status: SHIPPED | OUTPATIENT
Start: 2022-06-28

## 2022-06-28 RX ORDER — ASPIRIN 81 MG/1
81 TABLET ORAL DAILY
Qty: 30 TABLET | Refills: 3 | Status: SHIPPED | OUTPATIENT
Start: 2022-06-28

## 2022-06-28 RX ORDER — HYDROCODONE BITARTRATE AND ACETAMINOPHEN 5; 325 MG/1; MG/1
1-2 TABLET ORAL EVERY 6 HOURS PRN
Qty: 60 TABLET | Refills: 0 | Status: SHIPPED | OUTPATIENT
Start: 2022-06-28

## 2022-06-28 NOTE — CM/SW NOTE
06/28/22 1400   CM/SW Referral Data   Referral Source Physician   Reason for Referral Discharge planning   Informant Patient;Spouse/Significant Other   Patient Info   Patient's Current Mental Status at Time of Assessment Alert;Oriented   Patient's [address]   Patient lives with Spouse/Significant other   Patient Status Prior to Admission   Independent with ADLs and Mobility Yes   Discharge Needs   Anticipated D/C needs Home health care     SW met with pt and wife to assess for dc planning. Pt is s/p CABG. Discussed HH at dc but that  has sent over 70 referrals for [address] and have no accepting [facility] agencies. Explained that if we get an accepting [address] referral after dc,  will call him. Sarah from  is aware.     Zaire Colon, [facility]  /Discharge Planner  [phone]

## 2022-06-28 NOTE — CM/SW NOTE
Still no accepting HHC- [facility] trying to see if they can take pt and will follow up with SW tomorrow if they can accept [phone].     Bing Aguero LCSW  /Discharge Planner  [phone]

## 2022-06-28 NOTE — PROGRESS NOTES
NURSING DISCHARGE NOTE    Discharged Home via Wheelchair. Accompanied by Support staff  Belongings Taken by patient/family. Patient discharged to home. Discharge instructions given to and understood by patient and patient's wife at bedside. Patient sent with medications. Appointments reviewed. All questions answered. Patient left unit with all belongings and in no signs or symptoms of distress.

## 2022-06-28 NOTE — PLAN OF CARE
Patient alert and oriented x 4. Up standby. On RA. NSR on tele. Continent of bowel and bladder. No complaints of pain, shortness of breath, or chest pain/discomfort. POC: discharge planning. Fall precautions in place. Call light within reach. Problem: Patient/Family Goals  Goal: Patient/Family Long Term Goal  Description: Patient's Long Term Goal: disease management    Interventions:  - follow up care with Mds  - cardiac rehab  - medication management    - See additional Care Plan goals for specific interventions  Outcome: Progressing  Goal: Patient/Family Short Term Goal  Description: Patient's Short Term Goal: Discharge from hospital    Interventions:   - Medication management  - Pain management  - Participate in ADLS to return to baseline function    - See additional Care Plan goals for specific interventions  Outcome: Progressing     Problem: CARDIOVASCULAR - ADULT  Goal: Maintains optimal cardiac output and hemodynamic stability  Description: INTERVENTIONS:  - Monitor vital signs, rhythm, and trends  - Monitor for bleeding, hypotension and signs of decreased cardiac output  - Evaluate effectiveness of vasoactive medications to optimize hemodynamic stability  - Monitor arterial and/or venous puncture sites for bleeding and/or hematoma  - Assess quality of pulses, skin color and temperature  - Assess for signs of decreased coronary artery perfusion - ex.  Angina  - Evaluate fluid balance, assess for edema, trend weights  Outcome: Progressing  Goal: Absence of cardiac arrhythmias or at baseline  Description: INTERVENTIONS:  - Continuous cardiac monitoring, monitor vital signs, obtain 12 lead EKG if indicated  - Evaluate effectiveness of antiarrhythmic and heart rate control medications as ordered  - Initiate emergency measures for life threatening arrhythmias  - Monitor electrolytes and administer replacement therapy as ordered  Outcome: Progressing     Problem: Diabetes/Glucose Control  Goal: Glucose maintained within prescribed range  Description: INTERVENTIONS:  - Monitor Blood Glucose as ordered  - Assess for signs and symptoms of hyperglycemia and hypoglycemia  - Administer ordered medications to maintain glucose within target range  - Assess barriers to adequate nutritional intake and initiate nutrition consult as needed  - Instruct patient on self management of diabetes  Outcome: Progressing     Problem: METABOLIC/FLUID AND ELECTROLYTES - ADULT  Goal: Electrolytes maintained within normal limits  Description: INTERVENTIONS:  - Monitor labs and rhythm and assess patient for signs and symptoms of electrolyte imbalances  - Administer electrolyte replacement as ordered  - Monitor response to electrolyte replacements, including rhythm and repeat lab results as appropriate  - Fluid restriction as ordered  - Instruct patient on fluid and nutrition restrictions as appropriate  Outcome: Progressing  Goal: Hemodynamic stability and optimal renal function maintained  Description: INTERVENTIONS:  - Monitor labs and assess for signs and symptoms of volume excess or deficit  - Monitor intake, output and patient weight  - Monitor urine specific gravity, serum osmolarity and serum sodium as indicated or ordered  - Monitor response to interventions for patient's volume status, including labs, urine output, blood pressure (other measures as available)  - Encourage oral intake as appropriate  - Instruct patient on fluid and nutrition restrictions as appropriate  Outcome: Progressing     Problem: SKIN/TISSUE INTEGRITY - ADULT  Goal: Skin integrity remains intact  Description: INTERVENTIONS  - Assess and document risk factors for pressure ulcer development  - Assess and document skin integrity  - Monitor for areas of redness and/or skin breakdown  - Initiate interventions, skin care algorithm/standards of care as needed  Outcome: Progressing  Goal: Incision(s), wounds(s) or drain site(s) healing without S/S of infection  Description: INTERVENTIONS:  - Assess and document risk factors for pressure ulcer development  - Assess and document skin integrity  - Assess and document dressing/incision, wound bed, drain sites and surrounding tissue  - Implement wound care per orders  - Initiate isolation precautions as appropriate  - Initiate Pressure Ulcer prevention bundle as indicated  Outcome: Progressing

## 2022-06-28 NOTE — PLAN OF CARE
POD #6  A&Ox4  O2 sat WNL on RA  SR on tele  Continent of bladder and bowel. Miralax given for BM  Up with SBA   Tolerating ambulation well  Norco given for pain control     Plan: remove pacer wires in am, discharge              Problem: Patient/Family Goals  Goal: Patient/Family Long Term Goal  Description: Patient's Long Term Goal: disease management    Interventions:  - follow up care with Mds  - cardiac rehab  - medication management    - See additional Care Plan goals for specific interventions  Outcome: Progressing  Goal: Patient/Family Short Term Goal  Description: Patient's Short Term Goal: Discharge from hospital    Interventions:   - Medication management  - Pain management  - Participate in ADLS to return to baseline function    - See additional Care Plan goals for specific interventions  Outcome: Progressing     Problem: CARDIOVASCULAR - ADULT  Goal: Maintains optimal cardiac output and hemodynamic stability  Description: INTERVENTIONS:  - Monitor vital signs, rhythm, and trends  - Monitor for bleeding, hypotension and signs of decreased cardiac output  - Evaluate effectiveness of vasoactive medications to optimize hemodynamic stability  - Monitor arterial and/or venous puncture sites for bleeding and/or hematoma  - Assess quality of pulses, skin color and temperature  - Assess for signs of decreased coronary artery perfusion - ex.  Angina  - Evaluate fluid balance, assess for edema, trend weights  Outcome: Progressing  Goal: Absence of cardiac arrhythmias or at baseline  Description: INTERVENTIONS:  - Continuous cardiac monitoring, monitor vital signs, obtain 12 lead EKG if indicated  - Evaluate effectiveness of antiarrhythmic and heart rate control medications as ordered  - Initiate emergency measures for life threatening arrhythmias  - Monitor electrolytes and administer replacement therapy as ordered  Outcome: Progressing     Problem: Diabetes/Glucose Control  Goal: Glucose maintained within prescribed range  Description: INTERVENTIONS:  - Monitor Blood Glucose as ordered  - Assess for signs and symptoms of hyperglycemia and hypoglycemia  - Administer ordered medications to maintain glucose within target range  - Assess barriers to adequate nutritional intake and initiate nutrition consult as needed  - Instruct patient on self management of diabetes  Outcome: Progressing     Problem: METABOLIC/FLUID AND ELECTROLYTES - ADULT  Goal: Electrolytes maintained within normal limits  Description: INTERVENTIONS:  - Monitor labs and rhythm and assess patient for signs and symptoms of electrolyte imbalances  - Administer electrolyte replacement as ordered  - Monitor response to electrolyte replacements, including rhythm and repeat lab results as appropriate  - Fluid restriction as ordered  - Instruct patient on fluid and nutrition restrictions as appropriate  Outcome: Progressing  Goal: Hemodynamic stability and optimal renal function maintained  Description: INTERVENTIONS:  - Monitor labs and assess for signs and symptoms of volume excess or deficit  - Monitor intake, output and patient weight  - Monitor urine specific gravity, serum osmolarity and serum sodium as indicated or ordered  - Monitor response to interventions for patient's volume status, including labs, urine output, blood pressure (other measures as available)  - Encourage oral intake as appropriate  - Instruct patient on fluid and nutrition restrictions as appropriate  Outcome: Progressing     Problem: SKIN/TISSUE INTEGRITY - ADULT  Goal: Skin integrity remains intact  Description: INTERVENTIONS  - Assess and document risk factors for pressure ulcer development  - Assess and document skin integrity  - Monitor for areas of redness and/or skin breakdown  - Initiate interventions, skin care algorithm/standards of care as needed  Outcome: Progressing  Goal: Incision(s), wounds(s) or drain site(s) healing without S/S of infection  Description: INTERVENTIONS:  - Assess and document risk factors for pressure ulcer development  - Assess and document skin integrity  - Assess and document dressing/incision, wound bed, drain sites and surrounding tissue  - Implement wound care per orders  - Initiate isolation precautions as appropriate  - Initiate Pressure Ulcer prevention bundle as indicated  Outcome: Progressing

## 2022-06-29 ENCOUNTER — PATIENT OUTREACH (OUTPATIENT)
Dept: CASE MANAGEMENT | Age: 52
End: 2022-06-29

## 2022-06-29 DIAGNOSIS — Z02.9 ENCOUNTERS FOR ADMINISTRATIVE PURPOSE: ICD-10-CM

## 2022-06-29 NOTE — CM/SW NOTE
SW received call from [facility] at [facility] [phone] that they were able to accept pt for home health. AVS sent to [facility] in [address] [facility]. [facility] stated that they had already contacted the pt and family and scheduled a nursing visit. Ronak Gannon in CV Surgery updated.      TIKA Garsia, [facility]  Discharge [address] [facility].

## 2022-06-30 ENCOUNTER — OFFICE VISIT (OUTPATIENT)
Dept: INTERNAL MEDICINE CLINIC | Facility: CLINIC | Age: 52
End: 2022-06-30
Payer: COMMERCIAL

## 2022-06-30 VITALS
OXYGEN SATURATION: 95 % | HEIGHT: 70 IN | WEIGHT: 127 LBS | TEMPERATURE: 98 F | DIASTOLIC BLOOD PRESSURE: 65 MMHG | RESPIRATION RATE: 18 BRPM | HEART RATE: 88 BPM | SYSTOLIC BLOOD PRESSURE: 96 MMHG | BODY MASS INDEX: 18.18 KG/M2

## 2022-06-30 DIAGNOSIS — Z95.1 S/P CABG (CORONARY ARTERY BYPASS GRAFT): ICD-10-CM

## 2022-06-30 DIAGNOSIS — R57.0 CARDIOGENIC SHOCK (HCC): ICD-10-CM

## 2022-06-30 DIAGNOSIS — E11.9 TYPE 2 DIABETES MELLITUS WITHOUT COMPLICATION, WITHOUT LONG-TERM CURRENT USE OF INSULIN (HCC): Chronic | ICD-10-CM

## 2022-06-30 DIAGNOSIS — I25.10 CAD, MULTIPLE VESSEL: ICD-10-CM

## 2022-06-30 DIAGNOSIS — E78.5 DYSLIPIDEMIA: ICD-10-CM

## 2022-06-30 DIAGNOSIS — I21.4 NSTEMI (NON-ST ELEVATED MYOCARDIAL INFARCTION) (HCC): ICD-10-CM

## 2022-06-30 DIAGNOSIS — F17.200 TOBACCO DEPENDENCY: ICD-10-CM

## 2022-06-30 DIAGNOSIS — R07.9 ACUTE CHEST PAIN: Primary | ICD-10-CM

## 2022-06-30 PROCEDURE — 3074F SYST BP LT 130 MM HG: CPT | Performed by: NURSE PRACTITIONER

## 2022-06-30 PROCEDURE — 99495 TRANSJ CARE MGMT MOD F2F 14D: CPT | Performed by: NURSE PRACTITIONER

## 2022-06-30 PROCEDURE — 3078F DIAST BP <80 MM HG: CPT | Performed by: NURSE PRACTITIONER

## 2022-06-30 PROCEDURE — 3008F BODY MASS INDEX DOCD: CPT | Performed by: NURSE PRACTITIONER

## 2022-06-30 RX ORDER — INSULIN LISPRO 100 [IU]/ML
INJECTION, SOLUTION INTRAVENOUS; SUBCUTANEOUS
COMMUNITY
Start: 2022-06-28

## 2022-07-01 DIAGNOSIS — E11.9 TYPE 2 DIABETES MELLITUS WITHOUT COMPLICATION, WITHOUT LONG-TERM CURRENT USE OF INSULIN (HCC): Primary | ICD-10-CM

## 2022-07-01 NOTE — PROGRESS NOTES
Follow Up Phone Call    1. How are you doing now that you are home? 2. Have there been any changes in your wound/incision since going home? SS removal 7/6/22    3. Is your pain manageable at home? 4. Are you following the walking routine given to you in the hospital?     5. Are you continuing to use your incentive spirometer? 6. Do you have your appointments for Chest Xray?  7/5/22 @ 1:15p,                                                              Primary MD?  Dr Mattie Yousif 1 wk                                                              Cardiologist?Dr Pancho Maldonado 7/5/22 2pm                                                              Dr. Judi Cain? Raisa Melendez 7/19/22 10:45am                                                               Cardiac Rehab?  7/28/22 @ 1:30p,    7. Do you have any other questions or concerns today? Immediately to [facility].     Angela Buckner RN  7/1/2022  11:08 AM

## 2022-07-05 ENCOUNTER — HOSPITAL ENCOUNTER (OUTPATIENT)
Dept: GENERAL RADIOLOGY | Facility: HOSPITAL | Age: 52
Discharge: HOME | End: 2022-07-05
Attending: THORACIC SURGERY (CARDIOTHORACIC VASCULAR SURGERY)
Payer: COMMERCIAL

## 2022-07-05 DIAGNOSIS — J90 PLEURAL EFFUSION: ICD-10-CM

## 2022-07-05 PROCEDURE — 71048 X-RAY EXAM CHEST 4+ VIEWS: CPT | Performed by: THORACIC SURGERY (CARDIOTHORACIC VASCULAR SURGERY)

## 2022-07-07 ENCOUNTER — OFFICE VISIT (OUTPATIENT)
Dept: ENDOCRINOLOGY CLINIC | Facility: CLINIC | Age: 52
End: 2022-07-07
Payer: COMMERCIAL

## 2022-07-07 VITALS
HEART RATE: 62 BPM | DIASTOLIC BLOOD PRESSURE: 60 MMHG | WEIGHT: 125.81 LBS | SYSTOLIC BLOOD PRESSURE: 98 MMHG | BODY MASS INDEX: 18 KG/M2

## 2022-07-07 DIAGNOSIS — Z79.4 OTHER SPECIFIED DIABETES MELLITUS WITHOUT COMPLICATION, WITH LONG-TERM CURRENT USE OF INSULIN (HCC): Primary | ICD-10-CM

## 2022-07-07 DIAGNOSIS — E13.9 OTHER SPECIFIED DIABETES MELLITUS WITHOUT COMPLICATION, WITH LONG-TERM CURRENT USE OF INSULIN (HCC): Primary | ICD-10-CM

## 2022-07-07 PROCEDURE — 99205 OFFICE O/P NEW HI 60 MIN: CPT | Performed by: STUDENT IN AN ORGANIZED HEALTH CARE EDUCATION/TRAINING PROGRAM

## 2022-07-07 PROCEDURE — 3078F DIAST BP <80 MM HG: CPT | Performed by: STUDENT IN AN ORGANIZED HEALTH CARE EDUCATION/TRAINING PROGRAM

## 2022-07-07 PROCEDURE — 3074F SYST BP LT 130 MM HG: CPT | Performed by: STUDENT IN AN ORGANIZED HEALTH CARE EDUCATION/TRAINING PROGRAM

## 2022-07-07 RX ORDER — BLOOD SUGAR DIAGNOSTIC
STRIP MISCELLANEOUS
Qty: 125 STRIP | Refills: 5 | Status: SHIPPED | OUTPATIENT
Start: 2022-07-07

## 2022-07-08 PROBLEM — Z95.1 S/P CABG (CORONARY ARTERY BYPASS GRAFT): Status: ACTIVE | Noted: 2022-07-08

## 2022-07-08 PROBLEM — F17.200 TOBACCO DEPENDENCY: Status: ACTIVE | Noted: 2022-07-08

## 2022-07-08 PROBLEM — E78.5 DYSLIPIDEMIA: Status: ACTIVE | Noted: 2022-07-08

## 2022-07-12 ENCOUNTER — NURSE ONLY (OUTPATIENT)
Dept: ENDOCRINOLOGY CLINIC | Facility: CLINIC | Age: 52
End: 2022-07-12
Payer: COMMERCIAL

## 2022-07-12 VITALS — BODY MASS INDEX: 19 KG/M2 | WEIGHT: 133 LBS

## 2022-07-12 DIAGNOSIS — E11.9 TYPE 2 DIABETES MELLITUS WITHOUT COMPLICATION, WITHOUT LONG-TERM CURRENT USE OF INSULIN (HCC): Chronic | ICD-10-CM

## 2022-07-12 PROCEDURE — G0108 DIAB MANAGE TRN  PER INDIV: HCPCS | Performed by: DIETITIAN, REGISTERED

## 2022-07-25 ENCOUNTER — TELEPHONE (OUTPATIENT)
Dept: ENDOCRINOLOGY CLINIC | Facility: CLINIC | Age: 52
End: 2022-07-25

## 2022-07-25 RX ORDER — PEN NEEDLE, DIABETIC 32GX 5/32"
1 NEEDLE, DISPOSABLE MISCELLANEOUS
Qty: 200 EACH | Refills: 3 | Status: SHIPPED | OUTPATIENT
Start: 2022-07-25 | End: 2023-07-25

## 2022-07-25 NOTE — TELEPHONE ENCOUNTER
Patient wife called requesting refill on BD Unique 2, 32 gauge needles. Please send to [facility] and send my chart message when complete. Thank you.

## 2022-07-26 ENCOUNTER — OFFICE VISIT (OUTPATIENT)
Dept: INTERNAL MEDICINE CLINIC | Facility: CLINIC | Age: 52
End: 2022-07-26
Payer: COMMERCIAL

## 2022-07-26 VITALS
SYSTOLIC BLOOD PRESSURE: 110 MMHG | OXYGEN SATURATION: 98 % | WEIGHT: 130 LBS | BODY MASS INDEX: 18.82 KG/M2 | TEMPERATURE: 98 F | RESPIRATION RATE: 16 BRPM | DIASTOLIC BLOOD PRESSURE: 66 MMHG | HEIGHT: 69.5 IN | HEART RATE: 68 BPM

## 2022-07-26 DIAGNOSIS — Z00.00 ANNUAL PHYSICAL EXAM: Primary | ICD-10-CM

## 2022-07-26 DIAGNOSIS — Z12.5 PROSTATE CANCER SCREENING: ICD-10-CM

## 2022-07-26 DIAGNOSIS — Z23 NEED FOR PNEUMOCOCCAL VACCINATION: ICD-10-CM

## 2022-07-26 DIAGNOSIS — Z13.29 THYROID DISORDER SCREEN: ICD-10-CM

## 2022-07-26 DIAGNOSIS — Z13.220 LIPID SCREENING: ICD-10-CM

## 2022-07-26 DIAGNOSIS — Z00.00 LABORATORY EXAMINATION ORDERED AS PART OF A ROUTINE GENERAL MEDICAL EXAMINATION: ICD-10-CM

## 2022-07-26 DIAGNOSIS — Z12.11 COLON CANCER SCREENING: ICD-10-CM

## 2022-07-26 DIAGNOSIS — Z13.89 SCREENING FOR GENITOURINARY CONDITION: ICD-10-CM

## 2022-07-26 DIAGNOSIS — Z23 NEED FOR TDAP VACCINATION: ICD-10-CM

## 2022-07-26 DIAGNOSIS — Z13.0 SCREENING, IRON DEFICIENCY ANEMIA: ICD-10-CM

## 2022-07-26 PROBLEM — R07.9 ACUTE CHEST PAIN: Status: RESOLVED | Noted: 2022-06-22 | Resolved: 2022-07-26

## 2022-07-26 PROBLEM — F17.200 TOBACCO DEPENDENCY: Status: RESOLVED | Noted: 2022-07-08 | Resolved: 2022-07-26

## 2022-07-26 PROBLEM — E78.5 DYSLIPIDEMIA: Status: RESOLVED | Noted: 2022-07-08 | Resolved: 2022-07-26

## 2022-07-26 PROBLEM — E78.2 MIXED HYPERLIPIDEMIA: Status: ACTIVE | Noted: 2022-07-26

## 2022-07-26 PROCEDURE — 90677 PCV20 VACCINE IM: CPT | Performed by: INTERNAL MEDICINE

## 2022-07-26 PROCEDURE — 99396 PREV VISIT EST AGE 40-64: CPT | Performed by: INTERNAL MEDICINE

## 2022-07-26 PROCEDURE — 90471 IMMUNIZATION ADMIN: CPT | Performed by: INTERNAL MEDICINE

## 2022-07-26 PROCEDURE — 3074F SYST BP LT 130 MM HG: CPT | Performed by: INTERNAL MEDICINE

## 2022-07-26 PROCEDURE — 90472 IMMUNIZATION ADMIN EACH ADD: CPT | Performed by: INTERNAL MEDICINE

## 2022-07-26 PROCEDURE — 3008F BODY MASS INDEX DOCD: CPT | Performed by: INTERNAL MEDICINE

## 2022-07-26 PROCEDURE — 90715 TDAP VACCINE 7 YRS/> IM: CPT | Performed by: INTERNAL MEDICINE

## 2022-07-26 PROCEDURE — 3078F DIAST BP <80 MM HG: CPT | Performed by: INTERNAL MEDICINE

## 2022-07-27 ENCOUNTER — TELEPHONE (OUTPATIENT)
Dept: ENDOCRINOLOGY CLINIC | Facility: CLINIC | Age: 52
End: 2022-07-27

## 2022-07-27 NOTE — TELEPHONE ENCOUNTER
Phoned patient's wife, Carmela Diana. Reviewed that Metformin refill was sent to pharmacy as requested. Dr. Kaylynn Parsons would also like patient to f/u with APN at [facility]- order was placed, gave phone number to call and schedule, states will do.

## 2022-07-27 NOTE — TELEPHONE ENCOUNTER
Patient's LOV: 7/7/22. At that time no specific follow up date given, states DM APN services- but no order placed?     Pended 3 month refill for Metformin and DM APN services

## 2022-07-27 NOTE — TELEPHONE ENCOUNTER
Patient's wife called requesting refill for Metformin. Cardiologist previously prescribed, advised patient's wife to request refill from Endocrine Dr. Haleigh Jimenez like Dr. Roberta Sands was [facility] to the office notes? Does not look like they have yet scheduled?

## 2022-07-28 ENCOUNTER — CARDPULM VISIT (OUTPATIENT)
Dept: CARDIAC REHAB | Facility: HOSPITAL | Age: 52
End: 2022-07-28
Attending: INTERNAL MEDICINE
Payer: COMMERCIAL

## 2022-07-28 VITALS — WEIGHT: 124.81 LBS | BODY MASS INDEX: 17.87 KG/M2 | HEIGHT: 70 IN

## 2022-08-01 ENCOUNTER — TELEPHONE (OUTPATIENT)
Dept: INTERNAL MEDICINE CLINIC | Facility: CLINIC | Age: 52
End: 2022-08-01

## 2022-08-01 NOTE — TELEPHONE ENCOUNTER
Pt was told by Dr. Terri Acevedo to taper off the hydrocodone and use tylenol    Per wife tylenol is not helping and would like to speak with someone

## 2022-08-01 NOTE — TELEPHONE ENCOUNTER
Patient had CABG performed 6/22. Has had two week f/u with JUDE Stanford and one month f/u with MUKUND Irwin on 7/19. Was informed to begin titration of Norco 5-325. Patient seen in office on 7/26 by Dr. Kin Garcia and was informed of same information and to begin OTC tylenol. Per patient's wife Sunni Mosquera [facility]) patient is currently taking Tylenol 1,000 mg TID prn for incision site pain management. Per patient's wife, patient has hardly slept the last 3-4 nights due to the incision site pain and minimal pain relief with use of OTC tylenol. Per Dr. Kin Garcia- Contact cardiac surgeon office and notify of ongoing incision site pain. Informed patient's wife of above and she verbalized understanding.

## 2022-08-04 ENCOUNTER — PATIENT MESSAGE (OUTPATIENT)
Dept: ENDOCRINOLOGY CLINIC | Facility: CLINIC | Age: 52
End: 2022-08-04

## 2022-08-04 RX ORDER — LANCETS 33 GAUGE
1 EACH MISCELLANEOUS
Qty: 100 EACH | Refills: 1 | Status: SHIPPED | OUTPATIENT
Start: 2022-08-04 | End: 2023-08-04

## 2022-08-04 NOTE — TELEPHONE ENCOUNTER
From: Batsheva Rodriguez  Sent: 8/4/2022 9:05 AM CDT  To: Beth Orellana Clinical Staff  Subject: Refill    Martin Blackburn. My  ran out of glucometer needles. [facility] you [facility] on [address]    He is using onetouch verio flex glucometer  Thank you

## 2022-08-05 ENCOUNTER — HOSPITAL ENCOUNTER (OUTPATIENT)
Dept: LAB | Facility: HOSPITAL | Age: 52
Discharge: HOME | End: 2022-08-05
Attending: INTERNAL MEDICINE
Payer: COMMERCIAL

## 2022-08-05 LAB
ALBUMIN SERPL-MCNC: 4.3 G/DL (ref 3.4–5)
ALBUMIN/GLOB SERPL: 1.2 {RATIO} (ref 1–2)
ALP LIVER SERPL-CCNC: 164 U/L
ALT SERPL-CCNC: 51 U/L
ANION GAP SERPL CALC-SCNC: 4 MMOL/L (ref 0–18)
AST SERPL-CCNC: 26 U/L (ref 15–37)
BILIRUB SERPL-MCNC: 1.5 MG/DL (ref 0.1–2)
BUN BLD-MCNC: 13 MG/DL (ref 7–18)
CALCIUM BLD-MCNC: 9.4 MG/DL (ref 8.5–10.1)
CHLORIDE SERPL-SCNC: 106 MMOL/L (ref 98–112)
CHOLEST SERPL-MCNC: 88 MG/DL (ref ?–200)
CO2 SERPL-SCNC: 28 MMOL/L (ref 21–32)
CREAT BLD-MCNC: 0.75 MG/DL
FASTING PATIENT LIPID ANSWER: YES
FASTING STATUS PATIENT QL REPORTED: YES
GFR SERPLBLD BASED ON 1.73 SQ M-ARVRAT: 110 ML/MIN/1.73M2 (ref 60–?)
GLOBULIN PLAS-MCNC: 3.6 G/DL (ref 2.8–4.4)
GLUCOSE BLD-MCNC: 147 MG/DL (ref 70–99)
HDLC SERPL-MCNC: 30 MG/DL (ref 40–59)
LDLC SERPL CALC-MCNC: 36 MG/DL (ref ?–100)
NONHDLC SERPL-MCNC: 58 MG/DL (ref ?–130)
OSMOLALITY SERPL CALC.SUM OF ELEC: 289 MOSM/KG (ref 275–295)
POTASSIUM SERPL-SCNC: 4.6 MMOL/L (ref 3.5–5.1)
PROT SERPL-MCNC: 7.9 G/DL (ref 6.4–8.2)
SODIUM SERPL-SCNC: 138 MMOL/L (ref 136–145)
TRIGL SERPL-MCNC: 121 MG/DL (ref 30–149)
VLDLC SERPL CALC-MCNC: 17 MG/DL (ref 0–30)

## 2022-08-05 PROCEDURE — 80053 COMPREHEN METABOLIC PANEL: CPT | Performed by: INTERNAL MEDICINE

## 2022-08-05 PROCEDURE — 80061 LIPID PANEL: CPT | Performed by: INTERNAL MEDICINE

## 2022-08-05 PROCEDURE — 36415 COLL VENOUS BLD VENIPUNCTURE: CPT | Performed by: INTERNAL MEDICINE

## 2022-08-08 ENCOUNTER — CARDPULM VISIT (OUTPATIENT)
Dept: CARDIAC REHAB | Facility: HOSPITAL | Age: 52
End: 2022-08-08
Attending: INTERNAL MEDICINE
Payer: COMMERCIAL

## 2022-08-08 PROCEDURE — 93798 PHYS/QHP OP CAR RHAB W/ECG: CPT

## 2022-08-10 ENCOUNTER — OFFICE VISIT (OUTPATIENT)
Dept: ENDOCRINOLOGY CLINIC | Facility: CLINIC | Age: 52
End: 2022-08-10
Payer: COMMERCIAL

## 2022-08-10 ENCOUNTER — CARDPULM VISIT (OUTPATIENT)
Dept: CARDIAC REHAB | Facility: HOSPITAL | Age: 52
End: 2022-08-10
Attending: INTERNAL MEDICINE
Payer: COMMERCIAL

## 2022-08-10 VITALS
HEART RATE: 79 BPM | DIASTOLIC BLOOD PRESSURE: 58 MMHG | WEIGHT: 129.63 LBS | BODY MASS INDEX: 18.77 KG/M2 | RESPIRATION RATE: 16 BRPM | HEIGHT: 69.5 IN | OXYGEN SATURATION: 99 % | SYSTOLIC BLOOD PRESSURE: 100 MMHG

## 2022-08-10 DIAGNOSIS — E11.65 TYPE 2 DIABETES MELLITUS WITH HYPERGLYCEMIA, WITHOUT LONG-TERM CURRENT USE OF INSULIN (HCC): Primary | ICD-10-CM

## 2022-08-10 LAB
CARTRIDGE LOT#: 978 NUMERIC
GLUCOSE BLOOD: 217
HEMOGLOBIN A1C: 7.6 % (ref 4.3–5.6)
TEST STRIP LOT #: NORMAL NUMERIC

## 2022-08-10 PROCEDURE — 93798 PHYS/QHP OP CAR RHAB W/ECG: CPT

## 2022-08-10 PROCEDURE — 99215 OFFICE O/P EST HI 40 MIN: CPT | Performed by: NURSE PRACTITIONER

## 2022-08-10 PROCEDURE — 3008F BODY MASS INDEX DOCD: CPT | Performed by: NURSE PRACTITIONER

## 2022-08-10 PROCEDURE — 82947 ASSAY GLUCOSE BLOOD QUANT: CPT | Performed by: NURSE PRACTITIONER

## 2022-08-10 PROCEDURE — 83036 HEMOGLOBIN GLYCOSYLATED A1C: CPT | Performed by: NURSE PRACTITIONER

## 2022-08-10 PROCEDURE — 3074F SYST BP LT 130 MM HG: CPT | Performed by: NURSE PRACTITIONER

## 2022-08-10 PROCEDURE — 3078F DIAST BP <80 MM HG: CPT | Performed by: NURSE PRACTITIONER

## 2022-08-11 ENCOUNTER — CARDPULM VISIT (OUTPATIENT)
Dept: CARDIAC REHAB | Facility: HOSPITAL | Age: 52
End: 2022-08-11
Attending: INTERNAL MEDICINE
Payer: COMMERCIAL

## 2022-08-11 PROCEDURE — 93798 PHYS/QHP OP CAR RHAB W/ECG: CPT

## 2022-08-15 ENCOUNTER — CARDPULM VISIT (OUTPATIENT)
Dept: CARDIAC REHAB | Facility: HOSPITAL | Age: 52
End: 2022-08-15
Attending: INTERNAL MEDICINE
Payer: COMMERCIAL

## 2022-08-15 PROCEDURE — 93798 PHYS/QHP OP CAR RHAB W/ECG: CPT

## 2022-08-17 ENCOUNTER — CARDPULM VISIT (OUTPATIENT)
Dept: CARDIAC REHAB | Facility: HOSPITAL | Age: 52
End: 2022-08-17
Attending: INTERNAL MEDICINE
Payer: COMMERCIAL

## 2022-08-17 PROCEDURE — 93798 PHYS/QHP OP CAR RHAB W/ECG: CPT

## 2022-08-18 ENCOUNTER — PATIENT MESSAGE (OUTPATIENT)
Dept: ENDOCRINOLOGY CLINIC | Facility: CLINIC | Age: 52
End: 2022-08-18

## 2022-08-18 ENCOUNTER — CARDPULM VISIT (OUTPATIENT)
Dept: CARDIAC REHAB | Facility: HOSPITAL | Age: 52
End: 2022-08-18
Attending: INTERNAL MEDICINE
Payer: COMMERCIAL

## 2022-08-18 PROCEDURE — 93798 PHYS/QHP OP CAR RHAB W/ECG: CPT

## 2022-08-18 NOTE — TELEPHONE ENCOUNTER
These were more specific endo labs and can take a few days to fully result.  We typically get a fax from [employer] or they go in Epic but if we do not receive them by Monday may have to call [employer]

## 2022-08-18 NOTE — TELEPHONE ENCOUNTER
From: MUKUND Al  To: Paula Lisbeth  Sent: 8/18/2022 2:51 PM CDT  Subject: Labs Due    Tylor New,     This is a friendly reminder that your labs that were previously ordered by Dr. Daphney Kocher are overdue. Please complete them prior to your next visit. If you have any questions or concerns please do not hesitate to send a reply message or contact the office. Thank you for allowing me to participate in your care.      Sincerely,   MUKUND Al

## 2022-08-22 ENCOUNTER — PATIENT MESSAGE (OUTPATIENT)
Dept: ENDOCRINOLOGY CLINIC | Facility: CLINIC | Age: 52
End: 2022-08-22

## 2022-08-22 ENCOUNTER — CARDPULM VISIT (OUTPATIENT)
Dept: CARDIAC REHAB | Facility: HOSPITAL | Age: 52
End: 2022-08-22
Attending: INTERNAL MEDICINE
Payer: COMMERCIAL

## 2022-08-22 PROCEDURE — 93798 PHYS/QHP OP CAR RHAB W/ECG: CPT

## 2022-08-22 NOTE — TELEPHONE ENCOUNTER
Patient sent MyChart request asking for lancets- was sent on 8/4 with a refill. Replied via [employer] asking patient to follow up with pharmacy- let office know if any further need.

## 2022-08-23 NOTE — TELEPHONE ENCOUNTER
Patient sent MyChart re: labs recently done at [address]. Have not received results. Fees[employer] response sent to get more information, will try to obtain results.

## 2022-08-23 NOTE — TELEPHONE ENCOUNTER
Phoned [facility], confirmed lab orders done- completed 8/17. Two results still pending- Zinc Transporter 8 Antibody and SUGEY-65 Autoantibody. States these are send out labs- usually take about 8 days. Gave our fax number- we should get a result when done. MyChart sent to patient explaining this. Will close this encounter.

## 2022-08-24 ENCOUNTER — CARDPULM VISIT (OUTPATIENT)
Dept: CARDIAC REHAB | Facility: HOSPITAL | Age: 52
End: 2022-08-24
Attending: INTERNAL MEDICINE
Payer: COMMERCIAL

## 2022-08-24 PROCEDURE — 93798 PHYS/QHP OP CAR RHAB W/ECG: CPT

## 2022-08-25 ENCOUNTER — CARDPULM VISIT (OUTPATIENT)
Dept: CARDIAC REHAB | Facility: HOSPITAL | Age: 52
End: 2022-08-25
Attending: INTERNAL MEDICINE
Payer: COMMERCIAL

## 2022-08-25 ENCOUNTER — TELEPHONE (OUTPATIENT)
Dept: ENDOCRINOLOGY CLINIC | Facility: CLINIC | Age: 52
End: 2022-08-25

## 2022-08-25 LAB
C-PEPTIDE: 1.46 NG/ML (ref 0.8–3.85)
GLUTAMIC ACID DECARBOXYLASE 65 AB: <5 IU/ML
IA-2 ANTIBODY: <5.4 U/ML
ZINC TRANSPORTER 8 (ZNT8) ANTIBODY: <10 U/ML

## 2022-08-25 PROCEDURE — 93798 PHYS/QHP OP CAR RHAB W/ECG: CPT

## 2022-08-25 NOTE — TELEPHONE ENCOUNTER
8/25/22-Received via fax from [facility] the following lab results completed on 8/17/22: IA-2 AutoAntibodies, C-Peptide, Zinc Transporter 8 Antibody, SUGEY-65 Autoantibody. Given to MD to review and placed in MD's in basket.

## 2022-08-29 ENCOUNTER — CARDPULM VISIT (OUTPATIENT)
Dept: CARDIAC REHAB | Facility: HOSPITAL | Age: 52
End: 2022-08-29
Attending: INTERNAL MEDICINE
Payer: COMMERCIAL

## 2022-08-29 PROCEDURE — 93798 PHYS/QHP OP CAR RHAB W/ECG: CPT

## 2022-08-31 ENCOUNTER — CARDPULM VISIT (OUTPATIENT)
Dept: CARDIAC REHAB | Facility: HOSPITAL | Age: 52
End: 2022-08-31
Attending: INTERNAL MEDICINE
Payer: COMMERCIAL

## 2022-08-31 PROCEDURE — 93798 PHYS/QHP OP CAR RHAB W/ECG: CPT

## 2022-09-01 ENCOUNTER — CARDPULM VISIT (OUTPATIENT)
Dept: CARDIAC REHAB | Facility: HOSPITAL | Age: 52
End: 2022-09-01
Attending: INTERNAL MEDICINE
Payer: COMMERCIAL

## 2022-09-01 PROCEDURE — 93798 PHYS/QHP OP CAR RHAB W/ECG: CPT

## 2022-09-05 ENCOUNTER — PATIENT MESSAGE (OUTPATIENT)
Dept: ENDOCRINOLOGY CLINIC | Facility: CLINIC | Age: 52
End: 2022-09-05

## 2022-09-05 DIAGNOSIS — Z79.4 OTHER SPECIFIED DIABETES MELLITUS WITHOUT COMPLICATION, WITH LONG-TERM CURRENT USE OF INSULIN (HCC): Primary | ICD-10-CM

## 2022-09-05 DIAGNOSIS — E13.9 OTHER SPECIFIED DIABETES MELLITUS WITHOUT COMPLICATION, WITH LONG-TERM CURRENT USE OF INSULIN (HCC): Primary | ICD-10-CM

## 2022-09-06 NOTE — TELEPHONE ENCOUNTER
Patient requested refill of Levemir pen by [facility]. Pended refill.      LOV: 7/7/22    RTC: 3 months    FU: not currently scheduled    Last Refill: 6/28/22    Month Supply Pending: 3

## 2022-09-07 ENCOUNTER — CARDPULM VISIT (OUTPATIENT)
Dept: CARDIAC REHAB | Facility: HOSPITAL | Age: 52
End: 2022-09-07
Attending: INTERNAL MEDICINE
Payer: COMMERCIAL

## 2022-09-07 PROCEDURE — 93798 PHYS/QHP OP CAR RHAB W/ECG: CPT

## 2022-09-07 RX ORDER — INSULIN DETEMIR 100 [IU]/ML
8 INJECTION, SOLUTION SUBCUTANEOUS EVERY 12 HOURS
Qty: 5 EACH | Refills: 0 | Status: SHIPPED | OUTPATIENT
Start: 2022-09-07

## 2022-09-08 ENCOUNTER — CARDPULM VISIT (OUTPATIENT)
Dept: CARDIAC REHAB | Facility: HOSPITAL | Age: 52
End: 2022-09-08
Attending: INTERNAL MEDICINE
Payer: COMMERCIAL

## 2022-09-08 PROCEDURE — 93798 PHYS/QHP OP CAR RHAB W/ECG: CPT

## 2022-09-11 ENCOUNTER — PATIENT MESSAGE (OUTPATIENT)
Dept: ENDOCRINOLOGY CLINIC | Facility: CLINIC | Age: 52
End: 2022-09-11

## 2022-09-12 ENCOUNTER — CARDPULM VISIT (OUTPATIENT)
Dept: CARDIAC REHAB | Facility: HOSPITAL | Age: 52
End: 2022-09-12
Attending: INTERNAL MEDICINE
Payer: COMMERCIAL

## 2022-09-12 PROCEDURE — 93798 PHYS/QHP OP CAR RHAB W/ECG: CPT

## 2022-09-12 NOTE — TELEPHONE ENCOUNTER
From: Mariluz Serrano  To: Mag Childress MD  Sent: 9/11/2022 8:33 PM CDT  Subject: Question regarding C-PEPTIDE    Hi Mag Childress. Is there any update about my test results . Any medication changes .   Thanks  Rony Linder

## 2022-09-12 NOTE — TELEPHONE ENCOUNTER
Per Dr. Kaylynn Parsons,  \"I actually already wrote to [facility] re: his labs. Insulin antibodies were ruled out. His C-peptide wasn't very robust, but there is still a possibility that [facility] can try him on some non-insulin medications to see if it can offset some of the insulin he is taking. [employer] message sent to patient reviewing this. Will close this encounter.

## 2022-09-12 NOTE — TELEPHONE ENCOUNTER
Pt wants to know if you have any suggestions after his blood work he had done in August at [place]. Results are in Surjit.  Emi Skelton at the [facility] didn't have them when pt reached out to her

## 2022-09-14 ENCOUNTER — CARDPULM VISIT (OUTPATIENT)
Dept: CARDIAC REHAB | Facility: HOSPITAL | Age: 52
End: 2022-09-14
Attending: INTERNAL MEDICINE
Payer: COMMERCIAL

## 2022-09-14 PROCEDURE — 93798 PHYS/QHP OP CAR RHAB W/ECG: CPT

## 2022-09-15 ENCOUNTER — CARDPULM VISIT (OUTPATIENT)
Dept: CARDIAC REHAB | Facility: HOSPITAL | Age: 52
End: 2022-09-15
Attending: INTERNAL MEDICINE
Payer: COMMERCIAL

## 2022-09-15 PROCEDURE — 93798 PHYS/QHP OP CAR RHAB W/ECG: CPT

## 2022-09-19 ENCOUNTER — CARDPULM VISIT (OUTPATIENT)
Dept: CARDIAC REHAB | Facility: HOSPITAL | Age: 52
End: 2022-09-19
Attending: INTERNAL MEDICINE
Payer: COMMERCIAL

## 2022-09-19 PROCEDURE — 93798 PHYS/QHP OP CAR RHAB W/ECG: CPT

## 2022-09-22 ENCOUNTER — CARDPULM VISIT (OUTPATIENT)
Dept: CARDIAC REHAB | Facility: HOSPITAL | Age: 52
End: 2022-09-22
Attending: INTERNAL MEDICINE

## 2022-09-22 PROCEDURE — 93798 PHYS/QHP OP CAR RHAB W/ECG: CPT

## 2022-09-26 ENCOUNTER — CARDPULM VISIT (OUTPATIENT)
Dept: CARDIAC REHAB | Facility: HOSPITAL | Age: 52
End: 2022-09-26
Attending: INTERNAL MEDICINE

## 2022-09-26 PROCEDURE — 93798 PHYS/QHP OP CAR RHAB W/ECG: CPT

## 2022-09-28 ENCOUNTER — TELEPHONE (OUTPATIENT)
Dept: ENDOCRINOLOGY CLINIC | Facility: CLINIC | Age: 52
End: 2022-09-28

## 2022-09-28 ENCOUNTER — CARDPULM VISIT (OUTPATIENT)
Dept: CARDIAC REHAB | Facility: HOSPITAL | Age: 52
End: 2022-09-28
Attending: INTERNAL MEDICINE

## 2022-09-28 PROCEDURE — 93798 PHYS/QHP OP CAR RHAB W/ECG: CPT

## 2022-09-29 ENCOUNTER — CARDPULM VISIT (OUTPATIENT)
Dept: CARDIAC REHAB | Facility: HOSPITAL | Age: 52
End: 2022-09-29
Attending: INTERNAL MEDICINE

## 2022-09-29 PROCEDURE — 93798 PHYS/QHP OP CAR RHAB W/ECG: CPT

## 2022-10-03 ENCOUNTER — CARDPULM VISIT (OUTPATIENT)
Dept: CARDIAC REHAB | Facility: HOSPITAL | Age: 52
End: 2022-10-03
Attending: INTERNAL MEDICINE
Payer: COMMERCIAL

## 2022-10-03 PROCEDURE — 93798 PHYS/QHP OP CAR RHAB W/ECG: CPT

## 2022-10-05 ENCOUNTER — CARDPULM VISIT (OUTPATIENT)
Dept: CARDIAC REHAB | Facility: HOSPITAL | Age: 52
End: 2022-10-05
Attending: INTERNAL MEDICINE
Payer: COMMERCIAL

## 2022-10-05 PROCEDURE — 93798 PHYS/QHP OP CAR RHAB W/ECG: CPT

## 2022-10-06 ENCOUNTER — CARDPULM VISIT (OUTPATIENT)
Dept: CARDIAC REHAB | Facility: HOSPITAL | Age: 52
End: 2022-10-06
Attending: INTERNAL MEDICINE
Payer: COMMERCIAL

## 2022-10-06 PROCEDURE — 93798 PHYS/QHP OP CAR RHAB W/ECG: CPT

## 2022-10-12 ENCOUNTER — CARDPULM VISIT (OUTPATIENT)
Dept: CARDIAC REHAB | Facility: HOSPITAL | Age: 52
End: 2022-10-12
Attending: INTERNAL MEDICINE
Payer: COMMERCIAL

## 2022-10-12 PROCEDURE — 93798 PHYS/QHP OP CAR RHAB W/ECG: CPT

## 2022-10-13 ENCOUNTER — CARDPULM VISIT (OUTPATIENT)
Dept: CARDIAC REHAB | Facility: HOSPITAL | Age: 52
End: 2022-10-13
Attending: INTERNAL MEDICINE
Payer: COMMERCIAL

## 2022-10-13 PROCEDURE — 93798 PHYS/QHP OP CAR RHAB W/ECG: CPT

## 2022-10-14 DIAGNOSIS — E11.9 TYPE 2 DIABETES MELLITUS WITHOUT COMPLICATION, WITHOUT LONG-TERM CURRENT USE OF INSULIN (HCC): ICD-10-CM

## 2022-10-17 ENCOUNTER — CARDPULM VISIT (OUTPATIENT)
Dept: CARDIAC REHAB | Facility: HOSPITAL | Age: 52
End: 2022-10-17
Attending: INTERNAL MEDICINE
Payer: COMMERCIAL

## 2022-10-17 DIAGNOSIS — E11.9 TYPE 2 DIABETES MELLITUS WITHOUT COMPLICATION, WITHOUT LONG-TERM CURRENT USE OF INSULIN (HCC): ICD-10-CM

## 2022-10-17 PROCEDURE — 93798 PHYS/QHP OP CAR RHAB W/ECG: CPT

## 2022-10-17 RX ORDER — LANCETS 33 GAUGE
1 EACH MISCELLANEOUS
Qty: 100 EACH | Refills: 0 | Status: SHIPPED | OUTPATIENT
Start: 2022-10-17 | End: 2023-10-17

## 2022-10-17 NOTE — TELEPHONE ENCOUNTER
LOV: 7/7/22    RTC: Was to f[facility]u with [facility]- 10/4 no show with [facility] APN. Has not rescheduled. FU: none scheduled    Last Refill: Metformin- 7/27, Lancets- 8/4    Month Supply Pending: one month    Attempt to phone patient to discuss needing f/u appointment, no answer, LM asking for call back.

## 2022-10-17 NOTE — TELEPHONE ENCOUNTER
Patient phoned back- willing to schedule f/u appt- transferred to [facility], will make appointment.

## 2022-10-19 ENCOUNTER — CARDPULM VISIT (OUTPATIENT)
Dept: CARDIAC REHAB | Facility: HOSPITAL | Age: 52
End: 2022-10-19
Attending: INTERNAL MEDICINE
Payer: COMMERCIAL

## 2022-10-19 ENCOUNTER — OFFICE VISIT (OUTPATIENT)
Dept: ENDOCRINOLOGY CLINIC | Facility: CLINIC | Age: 52
End: 2022-10-19
Payer: COMMERCIAL

## 2022-10-19 VITALS
SYSTOLIC BLOOD PRESSURE: 118 MMHG | RESPIRATION RATE: 18 BRPM | BODY MASS INDEX: 20 KG/M2 | HEART RATE: 81 BPM | WEIGHT: 137.19 LBS | DIASTOLIC BLOOD PRESSURE: 70 MMHG | OXYGEN SATURATION: 98 %

## 2022-10-19 DIAGNOSIS — Z79.4 TYPE 2 DIABETES MELLITUS WITH HYPERGLYCEMIA, WITH LONG-TERM CURRENT USE OF INSULIN (HCC): Primary | ICD-10-CM

## 2022-10-19 DIAGNOSIS — E11.65 TYPE 2 DIABETES MELLITUS WITH HYPERGLYCEMIA, WITH LONG-TERM CURRENT USE OF INSULIN (HCC): Primary | ICD-10-CM

## 2022-10-19 LAB
CARTRIDGE LOT#: 507 NUMERIC
GLUCOSE BLOOD: 335
HEMOGLOBIN A1C: 7.6 % (ref 4.3–5.6)
TEST STRIP LOT #: NORMAL NUMERIC

## 2022-10-19 PROCEDURE — 93798 PHYS/QHP OP CAR RHAB W/ECG: CPT

## 2022-10-19 PROCEDURE — 3078F DIAST BP <80 MM HG: CPT | Performed by: NURSE PRACTITIONER

## 2022-10-19 PROCEDURE — 99214 OFFICE O/P EST MOD 30 MIN: CPT | Performed by: NURSE PRACTITIONER

## 2022-10-19 PROCEDURE — 83036 HEMOGLOBIN GLYCOSYLATED A1C: CPT | Performed by: NURSE PRACTITIONER

## 2022-10-19 PROCEDURE — 3074F SYST BP LT 130 MM HG: CPT | Performed by: NURSE PRACTITIONER

## 2022-10-19 PROCEDURE — 82947 ASSAY GLUCOSE BLOOD QUANT: CPT | Performed by: NURSE PRACTITIONER

## 2022-10-19 RX ORDER — INSULIN DEGLUDEC INJECTION 100 U/ML
20 INJECTION, SOLUTION SUBCUTANEOUS DAILY
Qty: 15 ML | Refills: 0 | Status: SHIPPED | OUTPATIENT
Start: 2022-10-19

## 2022-10-19 RX ORDER — INSULIN DEGLUDEC INJECTION 100 U/ML
16 INJECTION, SOLUTION SUBCUTANEOUS DAILY
Qty: 3 ML | Refills: 0 | COMMUNITY
Start: 2022-10-19

## 2022-10-20 ENCOUNTER — CARDPULM VISIT (OUTPATIENT)
Dept: CARDIAC REHAB | Facility: HOSPITAL | Age: 52
End: 2022-10-20
Attending: INTERNAL MEDICINE
Payer: COMMERCIAL

## 2022-10-20 PROCEDURE — 93798 PHYS/QHP OP CAR RHAB W/ECG: CPT

## 2022-10-24 ENCOUNTER — CARDPULM VISIT (OUTPATIENT)
Dept: CARDIAC REHAB | Facility: HOSPITAL | Age: 52
End: 2022-10-24
Attending: INTERNAL MEDICINE
Payer: COMMERCIAL

## 2022-10-24 PROCEDURE — 93798 PHYS/QHP OP CAR RHAB W/ECG: CPT

## 2022-10-26 ENCOUNTER — CARDPULM VISIT (OUTPATIENT)
Dept: CARDIAC REHAB | Facility: HOSPITAL | Age: 52
End: 2022-10-26
Attending: INTERNAL MEDICINE
Payer: COMMERCIAL

## 2022-10-26 PROCEDURE — 93798 PHYS/QHP OP CAR RHAB W/ECG: CPT

## 2022-10-27 ENCOUNTER — APPOINTMENT (OUTPATIENT)
Dept: CARDIAC REHAB | Facility: HOSPITAL | Age: 52
End: 2022-10-27
Attending: INTERNAL MEDICINE
Payer: COMMERCIAL

## 2022-10-31 ENCOUNTER — LAB ENCOUNTER (OUTPATIENT)
Dept: LAB | Age: 52
End: 2022-10-31
Attending: NURSE PRACTITIONER
Payer: COMMERCIAL

## 2022-10-31 ENCOUNTER — APPOINTMENT (OUTPATIENT)
Dept: CARDIAC REHAB | Facility: HOSPITAL | Age: 52
End: 2022-10-31
Payer: COMMERCIAL

## 2022-10-31 DIAGNOSIS — D64.9 ANEMIA, UNSPECIFIED TYPE: ICD-10-CM

## 2022-10-31 DIAGNOSIS — R74.8 ELEVATED LIVER ENZYMES: ICD-10-CM

## 2022-10-31 LAB
ALBUMIN SERPL-MCNC: 3.9 G/DL (ref 3.4–5)
ALBUMIN/GLOB SERPL: 1.3 {RATIO} (ref 1–2)
ALP LIVER SERPL-CCNC: 96 U/L
ALT SERPL-CCNC: 26 U/L
ANION GAP SERPL CALC-SCNC: 4 MMOL/L (ref 0–18)
AST SERPL-CCNC: 15 U/L (ref 15–37)
BASOPHILS # BLD AUTO: 0.03 X10(3) UL (ref 0–0.2)
BASOPHILS NFR BLD AUTO: 0.5 %
BILIRUB SERPL-MCNC: 1 MG/DL (ref 0.1–2)
BUN BLD-MCNC: 9 MG/DL (ref 7–18)
CALCIUM BLD-MCNC: 9.4 MG/DL (ref 8.5–10.1)
CHLORIDE SERPL-SCNC: 109 MMOL/L (ref 98–112)
CO2 SERPL-SCNC: 27 MMOL/L (ref 21–32)
CREAT BLD-MCNC: 0.84 MG/DL
DEPRECATED HBV CORE AB SER IA-ACNC: 19.7 NG/ML
EOSINOPHIL # BLD AUTO: 0.07 X10(3) UL (ref 0–0.7)
EOSINOPHIL NFR BLD AUTO: 1.1 %
ERYTHROCYTE [DISTWIDTH] IN BLOOD BY AUTOMATED COUNT: 13.1 %
FASTING STATUS PATIENT QL REPORTED: YES
GFR SERPLBLD BASED ON 1.73 SQ M-ARVRAT: 106 ML/MIN/1.73M2 (ref 60–?)
GLOBULIN PLAS-MCNC: 3.1 G/DL (ref 2.8–4.4)
GLUCOSE BLD-MCNC: 121 MG/DL (ref 70–99)
HCT VFR BLD AUTO: 46.9 %
HGB BLD-MCNC: 15.1 G/DL
IMM GRANULOCYTES # BLD AUTO: 0.02 X10(3) UL (ref 0–1)
IMM GRANULOCYTES NFR BLD: 0.3 %
IRON SATN MFR SERPL: 18 %
IRON SERPL-MCNC: 69 UG/DL
LYMPHOCYTES # BLD AUTO: 2.65 X10(3) UL (ref 1–4)
LYMPHOCYTES NFR BLD AUTO: 40.3 %
MCH RBC QN AUTO: 29.4 PG (ref 26–34)
MCHC RBC AUTO-ENTMCNC: 32.2 G/DL (ref 31–37)
MCV RBC AUTO: 91.2 FL
MONOCYTES # BLD AUTO: 0.46 X10(3) UL (ref 0.1–1)
MONOCYTES NFR BLD AUTO: 7 %
NEUTROPHILS # BLD AUTO: 3.35 X10 (3) UL (ref 1.5–7.7)
NEUTROPHILS # BLD AUTO: 3.35 X10(3) UL (ref 1.5–7.7)
NEUTROPHILS NFR BLD AUTO: 50.8 %
OSMOLALITY SERPL CALC.SUM OF ELEC: 290 MOSM/KG (ref 275–295)
PLATELET # BLD AUTO: 205 10(3)UL (ref 150–450)
POTASSIUM SERPL-SCNC: 4.3 MMOL/L (ref 3.5–5.1)
PROT SERPL-MCNC: 7 G/DL (ref 6.4–8.2)
RBC # BLD AUTO: 5.14 X10(6)UL
SODIUM SERPL-SCNC: 140 MMOL/L (ref 136–145)
TIBC SERPL-MCNC: 381 UG/DL (ref 240–450)
TRANSFERRIN SERPL-MCNC: 256 MG/DL (ref 200–360)
WBC # BLD AUTO: 6.6 X10(3) UL (ref 4–11)

## 2022-10-31 PROCEDURE — 83550 IRON BINDING TEST: CPT

## 2022-10-31 PROCEDURE — 85025 COMPLETE CBC W/AUTO DIFF WBC: CPT

## 2022-10-31 PROCEDURE — 36415 COLL VENOUS BLD VENIPUNCTURE: CPT

## 2022-10-31 PROCEDURE — 80053 COMPREHEN METABOLIC PANEL: CPT

## 2022-10-31 PROCEDURE — 82728 ASSAY OF FERRITIN: CPT

## 2022-10-31 PROCEDURE — 83540 ASSAY OF IRON: CPT

## 2022-11-02 ENCOUNTER — CARDPULM VISIT (OUTPATIENT)
Dept: CARDIAC REHAB | Facility: HOSPITAL | Age: 52
End: 2022-11-02
Attending: INTERNAL MEDICINE
Payer: COMMERCIAL

## 2022-11-03 PROCEDURE — 93798 PHYS/QHP OP CAR RHAB W/ECG: CPT

## 2022-11-07 ENCOUNTER — CARDPULM VISIT (OUTPATIENT)
Dept: CARDIAC REHAB | Facility: HOSPITAL | Age: 52
End: 2022-11-07
Attending: INTERNAL MEDICINE
Payer: COMMERCIAL

## 2022-11-07 PROCEDURE — 93798 PHYS/QHP OP CAR RHAB W/ECG: CPT

## 2022-11-09 ENCOUNTER — CARDPULM VISIT (OUTPATIENT)
Dept: CARDIAC REHAB | Facility: HOSPITAL | Age: 52
End: 2022-11-09
Attending: INTERNAL MEDICINE
Payer: COMMERCIAL

## 2022-11-09 PROCEDURE — 93798 PHYS/QHP OP CAR RHAB W/ECG: CPT

## 2022-11-10 ENCOUNTER — CARDPULM VISIT (OUTPATIENT)
Dept: CARDIAC REHAB | Facility: HOSPITAL | Age: 52
End: 2022-11-10
Attending: INTERNAL MEDICINE
Payer: COMMERCIAL

## 2022-11-10 PROCEDURE — 93798 PHYS/QHP OP CAR RHAB W/ECG: CPT

## 2022-12-03 DIAGNOSIS — E11.65 TYPE 2 DIABETES MELLITUS WITH HYPERGLYCEMIA, WITH LONG-TERM CURRENT USE OF INSULIN (HCC): ICD-10-CM

## 2022-12-03 DIAGNOSIS — Z79.4 TYPE 2 DIABETES MELLITUS WITH HYPERGLYCEMIA, WITH LONG-TERM CURRENT USE OF INSULIN (HCC): ICD-10-CM

## 2022-12-06 RX ORDER — INSULIN DEGLUDEC INJECTION 100 U/ML
12 INJECTION, SOLUTION SUBCUTANEOUS DAILY
Qty: 15 ML | Refills: 0 | Status: SHIPPED | OUTPATIENT
Start: 2022-12-06

## 2022-12-15 ENCOUNTER — OFFICE VISIT (OUTPATIENT)
Dept: ENDOCRINOLOGY CLINIC | Facility: CLINIC | Age: 52
End: 2022-12-15
Payer: COMMERCIAL

## 2022-12-15 VITALS
DIASTOLIC BLOOD PRESSURE: 68 MMHG | RESPIRATION RATE: 18 BRPM | WEIGHT: 136 LBS | BODY MASS INDEX: 20 KG/M2 | SYSTOLIC BLOOD PRESSURE: 114 MMHG | OXYGEN SATURATION: 97 % | HEART RATE: 80 BPM

## 2022-12-15 DIAGNOSIS — Z79.4 TYPE 2 DIABETES MELLITUS WITH HYPERGLYCEMIA, WITH LONG-TERM CURRENT USE OF INSULIN (HCC): Primary | ICD-10-CM

## 2022-12-15 DIAGNOSIS — E11.65 TYPE 2 DIABETES MELLITUS WITH HYPERGLYCEMIA, WITH LONG-TERM CURRENT USE OF INSULIN (HCC): Primary | ICD-10-CM

## 2022-12-15 LAB
CARTRIDGE LOT#: 535 NUMERIC
HEMOGLOBIN A1C: 7.3 % (ref 4.3–5.6)

## 2022-12-15 PROCEDURE — 99214 OFFICE O/P EST MOD 30 MIN: CPT | Performed by: NURSE PRACTITIONER

## 2022-12-15 PROCEDURE — 83036 HEMOGLOBIN GLYCOSYLATED A1C: CPT | Performed by: NURSE PRACTITIONER

## 2022-12-15 PROCEDURE — 3078F DIAST BP <80 MM HG: CPT | Performed by: NURSE PRACTITIONER

## 2022-12-15 PROCEDURE — 3074F SYST BP LT 130 MM HG: CPT | Performed by: NURSE PRACTITIONER

## 2022-12-15 RX ORDER — BLOOD-GLUCOSE SENSOR
1 EACH MISCELLANEOUS
Qty: 2 EACH | Refills: 5 | Status: SHIPPED | OUTPATIENT
Start: 2022-12-15

## 2022-12-15 NOTE — PROGRESS NOTES
A continuous glucose sensor for 24 hour blood sugar monitoring was placed on patient today per APN order. Serial NUMBER: [id number]  Exp date: 4-  - After cleansing skin with alcohol prep, Sensor Ced 3 )was inserted on Left Posterior upper arm area without difficulty. Small amount of skin prep was added around sensor tape after placement to help with sensor adhesive. Area remains free of any bleeding or irritation or pain at site when patient left DM center. Patient instructions:   Record daily food/drink intake and activity in log book  Call Diabetes center with any questions or concerns.    instructed to record food, exercise, insulin doses (if taking) on log provided

## 2023-01-10 DIAGNOSIS — E11.65 TYPE 2 DIABETES MELLITUS WITH HYPERGLYCEMIA, WITH LONG-TERM CURRENT USE OF INSULIN (HCC): ICD-10-CM

## 2023-01-10 DIAGNOSIS — Z79.4 TYPE 2 DIABETES MELLITUS WITH HYPERGLYCEMIA, WITH LONG-TERM CURRENT USE OF INSULIN (HCC): ICD-10-CM

## 2023-01-10 RX ORDER — EMPAGLIFLOZIN 10 MG/1
TABLET, FILM COATED ORAL
Qty: 30 TABLET | Refills: 2 | Status: SHIPPED | OUTPATIENT
Start: 2023-01-10

## 2023-01-13 DIAGNOSIS — Z79.4 TYPE 2 DIABETES MELLITUS WITH HYPERGLYCEMIA, WITH LONG-TERM CURRENT USE OF INSULIN (HCC): ICD-10-CM

## 2023-01-13 DIAGNOSIS — E11.65 TYPE 2 DIABETES MELLITUS WITH HYPERGLYCEMIA, WITH LONG-TERM CURRENT USE OF INSULIN (HCC): ICD-10-CM

## 2023-01-20 ENCOUNTER — OFFICE VISIT (OUTPATIENT)
Dept: INTERNAL MEDICINE CLINIC | Facility: CLINIC | Age: 53
End: 2023-01-20
Payer: COMMERCIAL

## 2023-01-20 VITALS
HEART RATE: 76 BPM | OXYGEN SATURATION: 99 % | TEMPERATURE: 98 F | SYSTOLIC BLOOD PRESSURE: 116 MMHG | WEIGHT: 133 LBS | BODY MASS INDEX: 19.26 KG/M2 | DIASTOLIC BLOOD PRESSURE: 70 MMHG | HEIGHT: 69.5 IN | RESPIRATION RATE: 16 BRPM

## 2023-01-20 DIAGNOSIS — E11.9 TYPE 2 DIABETES MELLITUS WITHOUT COMPLICATION, WITHOUT LONG-TERM CURRENT USE OF INSULIN (HCC): Primary | Chronic | ICD-10-CM

## 2023-01-20 DIAGNOSIS — E78.2 MIXED HYPERLIPIDEMIA: ICD-10-CM

## 2023-01-20 PROCEDURE — 3008F BODY MASS INDEX DOCD: CPT | Performed by: INTERNAL MEDICINE

## 2023-01-20 PROCEDURE — 3074F SYST BP LT 130 MM HG: CPT | Performed by: INTERNAL MEDICINE

## 2023-01-20 PROCEDURE — 3078F DIAST BP <80 MM HG: CPT | Performed by: INTERNAL MEDICINE

## 2023-01-20 PROCEDURE — 99214 OFFICE O/P EST MOD 30 MIN: CPT | Performed by: INTERNAL MEDICINE

## 2023-01-21 DIAGNOSIS — E11.65 TYPE 2 DIABETES MELLITUS WITH HYPERGLYCEMIA, WITH LONG-TERM CURRENT USE OF INSULIN (HCC): ICD-10-CM

## 2023-01-21 DIAGNOSIS — Z79.4 TYPE 2 DIABETES MELLITUS WITH HYPERGLYCEMIA, WITH LONG-TERM CURRENT USE OF INSULIN (HCC): ICD-10-CM

## 2023-01-23 RX ORDER — INSULIN DEGLUDEC INJECTION 100 U/ML
12 INJECTION, SOLUTION SUBCUTANEOUS DAILY
Qty: 15 ML | Refills: 1 | Status: SHIPPED | OUTPATIENT
Start: 2023-01-23

## 2023-01-27 NOTE — TELEPHONE ENCOUNTER
Pt is requesting a 3 month supply because he will be leaving the country for 3 months. Can you approve a 90 day supply.  Last refill was 30 day supply with 2 refills

## 2023-03-02 DIAGNOSIS — E11.65 TYPE 2 DIABETES MELLITUS WITH HYPERGLYCEMIA, WITH LONG-TERM CURRENT USE OF INSULIN (HCC): ICD-10-CM

## 2023-03-02 DIAGNOSIS — Z79.4 TYPE 2 DIABETES MELLITUS WITH HYPERGLYCEMIA, WITH LONG-TERM CURRENT USE OF INSULIN (HCC): ICD-10-CM

## 2023-03-02 NOTE — TELEPHONE ENCOUNTER
Pt has no follow up appt - per our protocol please call and schedule as future refills may be delayed or refused

## 2023-07-10 DIAGNOSIS — Z79.4 TYPE 2 DIABETES MELLITUS WITH HYPERGLYCEMIA, WITH LONG-TERM CURRENT USE OF INSULIN (HCC): ICD-10-CM

## 2023-07-10 DIAGNOSIS — E11.65 TYPE 2 DIABETES MELLITUS WITH HYPERGLYCEMIA, WITH LONG-TERM CURRENT USE OF INSULIN (HCC): ICD-10-CM

## 2023-07-10 NOTE — TELEPHONE ENCOUNTER
Requested Prescriptions     Pending Prescriptions Disp Refills    METFORMIN 500 MG Oral Tab [Pharmacy Med Name: METFORMIN  MG TABLET] 360 tablet 0     Sig: TAKE 2 TABLETS BY MOUTH TWICE A DAY WITH MEALS     Your appointments       Date & Time Appointment Department ([address], [address])    Jul 28, 2023 10:00 AM CDT Physical - Established with Sharmin Sneed MD [address], [address] [place] [address] ([address] [facility])              [address], [facility] [address], [address] [place] [address]  [address] [place] [address] [address] [id number] [phone]          Last A1c value was 7.3% done 12/15/2022.     Refill 3/02/23  LOV 12/15/22    eGFR-Cr  >=60 mL/min/1.73m2 106       No FU appt scheduled with us, messaged pt via [facility]

## 2023-07-12 NOTE — TELEPHONE ENCOUNTER
[place] be back till August.   Future Appointments   Date Time Provider Jing Vanessa   7/28/2023 10:00 AM Anisa Villalba MD [facility] [address] [facility] [address]   8/15/2023  1:45 PM MUKUND Mejia [facility] [address]

## 2023-07-14 DIAGNOSIS — E11.65 TYPE 2 DIABETES MELLITUS WITH HYPERGLYCEMIA, WITH LONG-TERM CURRENT USE OF INSULIN (HCC): ICD-10-CM

## 2023-07-14 DIAGNOSIS — Z79.4 TYPE 2 DIABETES MELLITUS WITH HYPERGLYCEMIA, WITH LONG-TERM CURRENT USE OF INSULIN (HCC): ICD-10-CM

## 2023-07-14 RX ORDER — BLOOD-GLUCOSE SENSOR
1 EACH MISCELLANEOUS 4 TIMES DAILY
Qty: 2 EACH | Refills: 1 | Status: SHIPPED | OUTPATIENT
Start: 2023-07-14

## 2023-12-22 PROBLEM — E11.3293 NONPROLIFERATIVE DIABETIC RETINOPATHY OF BOTH EYES (HCC): Status: ACTIVE | Noted: 2023-12-22

## 2024-05-30 ENCOUNTER — OFFICE VISIT (OUTPATIENT)
Dept: ENDOCRINOLOGY CLINIC | Facility: CLINIC | Age: 54
End: 2024-05-30

## 2024-05-30 VITALS
BODY MASS INDEX: 20 KG/M2 | RESPIRATION RATE: 16 BRPM | WEIGHT: 137.19 LBS | OXYGEN SATURATION: 98 % | HEART RATE: 80 BPM | DIASTOLIC BLOOD PRESSURE: 66 MMHG | SYSTOLIC BLOOD PRESSURE: 108 MMHG

## 2024-05-30 DIAGNOSIS — E11.65 TYPE 2 DIABETES MELLITUS WITH HYPERGLYCEMIA, WITH LONG-TERM CURRENT USE OF INSULIN (HCC): Primary | ICD-10-CM

## 2024-05-30 DIAGNOSIS — I25.10 CORONARY ARTERY DISEASE INVOLVING NATIVE CORONARY ARTERY OF NATIVE HEART WITHOUT ANGINA PECTORIS: ICD-10-CM

## 2024-05-30 DIAGNOSIS — E78.2 MIXED HYPERLIPIDEMIA: ICD-10-CM

## 2024-05-30 DIAGNOSIS — Z79.4 TYPE 2 DIABETES MELLITUS WITH HYPERGLYCEMIA, WITH LONG-TERM CURRENT USE OF INSULIN (HCC): ICD-10-CM

## 2024-05-30 DIAGNOSIS — Z79.4 TYPE 2 DIABETES MELLITUS WITH HYPERGLYCEMIA, WITH LONG-TERM CURRENT USE OF INSULIN (HCC): Primary | ICD-10-CM

## 2024-05-30 DIAGNOSIS — E11.65 TYPE 2 DIABETES MELLITUS WITH HYPERGLYCEMIA, WITH LONG-TERM CURRENT USE OF INSULIN (HCC): ICD-10-CM

## 2024-05-30 LAB
GLUCOSE BLOOD: 246
HEMOGLOBIN A1C: 8.5 % (ref 4.3–5.6)
TEST STRIP LOT #: NORMAL NUMERIC

## 2024-05-30 PROCEDURE — 99214 OFFICE O/P EST MOD 30 MIN: CPT | Performed by: NURSE PRACTITIONER

## 2024-05-30 PROCEDURE — 83036 HEMOGLOBIN GLYCOSYLATED A1C: CPT | Performed by: NURSE PRACTITIONER

## 2024-05-30 PROCEDURE — 82947 ASSAY GLUCOSE BLOOD QUANT: CPT | Performed by: NURSE PRACTITIONER

## 2024-05-30 RX ORDER — METOPROLOL TARTRATE 50 MG/1
50 TABLET, FILM COATED ORAL
Qty: 60 TABLET | Refills: 2 | Status: SHIPPED | OUTPATIENT
Start: 2024-05-30

## 2024-05-30 RX ORDER — BLOOD-GLUCOSE SENSOR
1 EACH MISCELLANEOUS 4 TIMES DAILY
Qty: 2 EACH | Refills: 1 | Status: SHIPPED | OUTPATIENT
Start: 2024-05-30 | End: 2024-05-30

## 2024-05-30 RX ORDER — ATORVASTATIN CALCIUM 80 MG/1
80 TABLET, FILM COATED ORAL NIGHTLY
Qty: 90 TABLET | Refills: 0 | Status: SHIPPED | OUTPATIENT
Start: 2024-05-30

## 2024-05-30 RX ORDER — BLOOD-GLUCOSE SENSOR
EACH MISCELLANEOUS
Qty: 2 EACH | Refills: 1 | Status: SHIPPED | OUTPATIENT
Start: 2024-05-30

## 2024-05-30 RX ORDER — INSULIN DEGLUDEC 100 U/ML
12 INJECTION, SOLUTION SUBCUTANEOUS DAILY
Qty: 15 ML | Refills: 1 | Status: SHIPPED | OUTPATIENT
Start: 2024-05-30

## 2024-05-30 NOTE — TELEPHONE ENCOUNTER
Received fax from pharmacy Libree 3 sensor sig needs to be fixed repended to say change 1 every 14 days instead of 1 for 4x daily

## 2024-05-30 NOTE — PROGRESS NOTES
PCP: Dr. Nguyen  Initial consult date: 24    Chief Complaint   Patient presents with    Diabetes     Returning/follow up not checking BG pt was in [place] and came back this year has been with out Tresiba for 2 months and Jardiance 10 mg has been with out for 3 months. BG in office 246       HPI:   Shaq Boone is a 53 year old male who presents for an initial consult for management of diabetes. Last A1c value was 8.5% done 2024. Pt was last seen in  but has been in [place] for the past 2 years. Recently returned and does not know how long he will be staying. Pt states while in [place] was unable to afford Jardiance so has been out of this for the past 3 months and also ran out of Tresiba recently. Pt did not bring glucometer or glucose readings to appointment for review. POC glucose in office 246 mg/dl.      DM Hx:  Type: 2 (antibodies negative )  Onset:     Current Medications:   Metformin 500 m tabs twice daily with meals  Jardiance 10 mg daily (Not on currently)  Tresiba 12 units daily (Not on currently)    Previous Medications:   Humalog  Levemir  Novolin R    Complications/Co-morbidities:   Retinopathy  CAD  Hyperlipidemia  Hx of CABG    Hospitalizations for diabetes: No  History of pancreatitis: No  Episodes of hypoglycemia: No  Hypoglycemia management: Rule of 15      Modifying factors:  Medication/follow up adherence: Yes  Dietary compliance: Fair  Exercise: Yes; comment: walking    Glucose monitoring:   CGM - not on currently        Past History:   He  has a past medical history of Diabetes (HCC), High cholesterol, Other and unspecified hyperlipidemia, Stress, and Wears glasses.   His family history is not on file.   He  reports that he quit smoking about 23 months ago. His smoking use included cigarettes. He started smoking about 21 years ago. He has a 20 pack-year smoking history. He has never used smokeless tobacco. He reports that he does not drink alcohol and does not use  drugs.     He has No Known Allergies.     Current Outpatient Medications on File Prior to Visit   Medication Sig    aspirin 81 MG Oral Tab EC Take 1 tablet (81 mg total) by mouth daily.    Ferrous Sulfate 325 (65 Fe) MG Oral Tab Take 1 tablet (325 mg total) by mouth daily with breakfast.    [DISCONTINUED] Continuous Blood Gluc Sensor (FREESTYLE VERONIKA 3 SENSOR) Does not apply Misc USE AS DIRECTED EVERY 14 DAYS    [DISCONTINUED] metFORMIN 500 MG Oral Tab Take 2 tablets (1,000 mg total) by mouth 2 (two) times daily with meals.    [DISCONTINUED] ATORVASTATIN 80 MG Oral Tab TAKE 1 TABLET BY MOUTH EVERY DAY AT NIGHT    [DISCONTINUED] metoprolol tartrate 50 MG Oral Tab Take 1 tablet (50 mg total) by mouth 2x Daily(Beta Blocker).    acetaminophen 500 MG Oral Tab Take 500 mg by mouth every 6 (six) hours as needed for Pain. (Patient not taking: Reported on 12/15/2022)    Glucose Blood (ONETOUCH VERIO) In Vitro Strip 4x daily     No current facility-administered medications on file prior to visit.         BP Readings from Last 3 Encounters:   05/30/24 108/66   01/20/23 116/70   12/15/22 114/68     Wt Readings from Last 3 Encounters:   05/30/24 137 lb 3.2 oz (62.2 kg)   01/20/23 133 lb (60.3 kg)   12/15/22 136 lb (61.7 kg)       LABS:      HGBA1C:    Lab Results   Component Value Date    A1C 8.5 (A) 05/30/2024    A1C 7.3 (A) 12/15/2022    A1C 7.6 (A) 10/19/2022     (H) 06/22/2022       CMP  (most recent labs)   Lab Results   Component Value Date     (H) 10/31/2022    BUN 9 10/31/2022    CREATSERUM 0.84 10/31/2022    ANIONGAP 4 10/31/2022    EGFRCR 106 10/31/2022     08/30/2015    GFRNAA 118 06/26/2022    GFRAA 136 06/26/2022    CA 9.4 10/31/2022    OSMOCALC 290 10/31/2022    ALKPHO 96 10/31/2022    AST 15 10/31/2022    ALT 26 10/31/2022    BILT 1.0 10/31/2022    TP 7.0 10/31/2022    ALB 3.9 10/31/2022    GLOBULIN 3.1 10/31/2022     10/31/2022    K 4.3 10/31/2022     10/31/2022    CO2 27.0  10/31/2022         Lipids  (most recent labs)   Lab Results   Component Value Date/Time    CHOLEST 88 08/05/2022 08:39 AM    TRIG 121 08/05/2022 08:39 AM    HDL 30 (L) 08/05/2022 08:39 AM    LDL 36 08/05/2022 08:39 AM    NONHDLC 58 08/05/2022 08:39 AM         Thyroid  (most recent labs)   Lab Results   Component Value Date/Time    TSH 1.100 06/22/2022 12:08 PM        Micro Albumen/Creatinine:  No results found for: \"MALBP\", \"CREUR\", \"MICROALBCREA\"       REVIEW OF SYSTEMS:   GENERAL HEALTH: feels well otherwise  SKIN: denies any unusual skin lesions or rashes  EYES: denies vision changes  RESPIRATORY: denies shortness of breath with exertion  CARDIOVASCULAR: denies chest pain on exertion  GI: denies abdominal pain, N/V/D  NEURO: denies headaches and denies numbness and tingling to extremities  ENDO: denies polyuria, polyphagia, polydipsia     EXAM:     Vitals:    05/30/24 1405   BP: 108/66   Pulse: 80   Resp: 16   SpO2: 98%   Weight: 137 lb 3.2 oz (62.2 kg)     Vitals reviewed  Constitutional: Normal appearance, no acute distress  Skin: no signs of lipohypertrophy of the abdomen  Cardiovascular: Normal rate and regular rhythm  Pulmonary: Pulmonary effort is normal, lungs clear to auscultation   Neurologic: Alert and oriented  Psychiatric: Normal mood and affect  Musculoskeletal:  Diabetes foot exam:   Good foot hygiene.   Bilateral barefoot skin diabetic exam: normal.  Visualized feet and the appearance : normal.  Bilateral monofilament/sensation of both feet is normal. Vibration to dorsum to the first toe perceived.   Bilateral 2+ pedal pulse on exam   ASSESSMENT AND PLAN:   As for his Diabetes, it is poorly controlled, needs improvement. Pt asked to complete labs ASAP. Pt will resume using abel to better monitor glucose. Discussed will resume insulin at this time with currently elevated glucose in the 200's suspect A1C will be rising. Can then evaluate oral options only once less glucose toxic. C-peptide was lower  when checked but still producing insulin. Suspect much of pt's hyperglycemia is 2/2 diet. Discussed reducing carbs. May look into adding GLP-1 as well but will need to monitor for excess weight loss. Will continue jardiance for now for CV protection.     Medications:   Continue:   Metformin 500 m tabs twice daily with meals    Change:  Start Jardiance 10 mg daily  Start Tresiba 10 units daily     Please call if you have 2 glucose readings less than 80 mg/dl in 1 week so we can adjust your medications.      Recommendations:  BGM 1-2x daily, targets reviewed and provided in AVS  Reminded to get annual retinal exam  Check feet daily  Check labs as ordered    DM health maintenance:  Last dilated eye exam: No data recorded Exam shows retinopathy? No data recorded  Last diabetic foot exam: Last Foot Exam: 24    Nephropathy screening: Pt does not have a history of CKD and/or proteinuria.   Pt is not on ace/arb. BP is well controlled  Continue present management.  and Check labs for any new nephropathy.  Pt is on high intensity statin. Cholesterol is well controlled  Will check labs.   Continue present management.  Patient is on aspirin therapy. The patient's ASCVD 10 year risk score is Score not calculated.   Continue present management.  Patient is not on GLP-1/GIP class medication.   If pt returns to [place] would be cost barrier, also currently low BMI  Patient is on SGLT2i class medication.   Continue present management.     The patient indicates understanding of these issues and agrees to the plan.  Refills sent at time of office visit.    Diagnoses and all orders for this visit:    Type 2 diabetes mellitus with hyperglycemia, with long-term current use of insulin ([facility])  -     ASSAY QUANTITATIVE, GLUCOSE  -     HEMOGLOBIN A1C  -     Microalb/Creat Ratio, Random Urine; Future  -     CMP Now; Future  -     Lipids Now; Future  -     metFORMIN 500 MG Oral Tab; Take 2 tablets (1,000 mg total) by mouth 2 (two) times  daily with meals.  -     empagliflozin (JARDIANCE) 10 MG Oral Tab; Take 1 tablet (10 mg total) by mouth daily.  -     insulin degludec (TRESIBA FLEXTOUCH) 100 UNIT/ML Subcutaneous Solution Pen-injector; Inject 12 Units into the skin daily.  -     Insulin Pen Needle 32G X 4 MM Does not apply Misc; Use daily with insulin pen  -     Continuous Glucose Sensor (FREESTYLE VERONIKA 3 SENSOR) Does not apply Misc; 1 each 4 (four) times daily.    Mixed hyperlipidemia  -     atorvastatin 80 MG Oral Tab; Take 1 tablet (80 mg total) by mouth nightly.    Coronary artery disease involving native coronary artery of native heart without angina pectoris  -     metoprolol tartrate 50 MG Oral Tab; Take 1 tablet (50 mg total) by mouth 2x Daily(Beta Blocker).       Do the chronicity and potential for complications of disease pt will need ongoing monitoring.   Return in about 6 weeks (around 7/11/2024) for Video visit.    Spent 30 min obtaining patient history, evaluating patient, reviewing blood glucose trends, discussing treatment options, lifestyle modifications and completing documentation -this time does not including sensor interpretation time if applicable.   The risks and benefits of my recommendations, as well as other treatment options were discussed with the patient today. Questions were also answered to the best of my knowledge.    Amber DUVAL

## 2024-05-30 NOTE — PATIENT INSTRUCTIONS
Summary from visit:   Last A1c value was 8.5% done 2024.    Diabetes Medications:   Continue:   Metformin 500 m tabs twice daily with meals    Change:  Start Jardiance 10 mg daily  Start Tresiba 10 units daily       It is important to take all of your medications as prescribed. Please call me if you cannot get the prescriptions filled or are having issues with refills.   Also, please call me if you have any issues with medication questions, side effects, dosing questions or problems with your blood sugar trends BEFORE CHANGING OR STOPPING ANY MEDICATIONS.    Remember to bring your glucose meter or blood sugar logbook to every appointment here at the diabetes center.   In order for me to determine any patterns in your blood sugars, you will need to test your blood sugar 2 times daily.   Please call with any concerns and Call if 2 glucose readings <80 mg/dl in 1 week so medication adjustments can be made. , Complete ordered labs, and Schedule your annual diabetic eye exam prior to your next visit.   Return in about 6 weeks (around 2024) for Video visit.  ---------------------------------------------------------------------------------------------------------------------------------------------------    Reminders:  The A1C:  The A1C test provides us with your average blood sugar for the past 3 months. Keeping an A1C less than 7% for most people helps reduce or delay health problems that are related to diabetes. We sometimes make exceptions based on age, health history and other factors.     Blood sugar targets:  Before breakfast:   (preferably less than 110)  2 hours After meals: less than 180 (preferably less than 150)   Call for persistent blood sugars less than  75 or more than  200.   Blood sugars greater than 200 are not acceptable to reach your goal of improving diabetes      Hypoglycemia:    Watch for low blood sugars: (less than 70)  Symptoms of low blood sugar:   Shakiness or  dizziness  Cold, clammy skin or sweating  Feeling hungry  Headache  Nervousness  A hard, fast heartbeat  Weakness  Confusion or irritability  Blurred eyesight  Having nightmares or waking up confused or sweating  Numbness or tingling in the lips or tongue    Treatment of Low Blood sugar Action Plan  1. Check blood glucose to be sure that it is low. You can’t always go by symptoms. If in doubt, treat your low blood glucose anyway.  2. Take 15 grams of carbohydrate (carb). Here are some choices:  4 oz. regular fruit juice  3-4 glucose tablets  6 oz. regular soda   7-8 jelly beans  3. Recheck blood glucose after 10-15 minutes. If blood glucose is still low (less than 70 mg/dl) repeat the treatment (step 2).  4. If your next meal is more than one hour away, eat a small snack.  5. If you’re not sure what caused your low blood glucose, call your healthcare provider.  6. Always check your blood glucose before you drive           Diabetes Center Refill policy:     Allow 2-3 business days for refills  Contact your pharmacy at least 5 days prior to running out of medication and have them send an electronic request or submit request through the “request refill” option in your [employer] account.  Refills are not addressed after hours or on weekends; covering providers  do not authorize routine medications on weekends.  If your prescription is due for a refill, you may be due for a follow up appointment. This may impact the ability for you to get a 90 supply if requested.   To best provide you care, patients receiving routine medications need to be seen at least twice per year however if the A1C is above 8% you will be need to be seen more frequently.   Yearly blood work may also required for many medications to insure safe prescribing. If you are due for labs, you will have 30 days to complete the  requested labs before future refills are authorized.   In the event that your preferred pharmacy does not have the requested  medication in stock (e.g. Backordered), it is your responsibility to find another pharmacy that has the requested medication available.  We will gladly send a new prescription to that pharmacy at your request.       More and more people are living long and healthy lives with diabetes. We are here to help you manage your diabetes. Let’s work together to make a plan that you can balance in your daily life. Please continue with your primary care physician/provider for your routine health care maintenance.   Thank you,   Amber Guidry [facility] [place]

## 2024-07-10 ENCOUNTER — TELEMEDICINE (OUTPATIENT)
Dept: ENDOCRINOLOGY CLINIC | Facility: CLINIC | Age: 54
End: 2024-07-10
Payer: COMMERCIAL

## 2024-07-10 DIAGNOSIS — E11.65 TYPE 2 DIABETES MELLITUS WITH HYPERGLYCEMIA, WITH LONG-TERM CURRENT USE OF INSULIN (HCC): Primary | ICD-10-CM

## 2024-07-10 DIAGNOSIS — I25.10 CORONARY ARTERY DISEASE INVOLVING NATIVE CORONARY ARTERY OF NATIVE HEART WITHOUT ANGINA PECTORIS: ICD-10-CM

## 2024-07-10 DIAGNOSIS — Z79.4 TYPE 2 DIABETES MELLITUS WITH HYPERGLYCEMIA, WITH LONG-TERM CURRENT USE OF INSULIN (HCC): Primary | ICD-10-CM

## 2024-07-10 PROCEDURE — 99214 OFFICE O/P EST MOD 30 MIN: CPT | Performed by: NURSE PRACTITIONER

## 2024-07-10 PROCEDURE — 95251 CONT GLUC MNTR ANALYSIS I&R: CPT | Performed by: NURSE PRACTITIONER

## 2024-07-10 RX ORDER — GLUCAGON 3 MG/1
3 POWDER NASAL ONCE AS NEEDED
Qty: 1 EACH | Refills: 0 | Status: SHIPPED | OUTPATIENT
Start: 2024-07-10 | End: 2024-07-10

## 2024-07-10 RX ORDER — BLOOD-GLUCOSE SENSOR
EACH MISCELLANEOUS
Qty: 2 EACH | Refills: 1 | Status: SHIPPED | OUTPATIENT
Start: 2024-07-10

## 2024-07-10 NOTE — PROGRESS NOTES
PCP: Dr. Nguyen  Initial consult date: 24    Chief Complaint   Patient presents with    Diabetes       HPI:   Shaq Boone is a 54 year old male who presents for a follow up visit for management of diabetes. This visit is conducted using Telemedicine with live, interactive audio and video.  Patient verbally consents to Telemedicine visit.  Patient understands and accepts financial responsibility for any deductible, co-insurance and/or co-pays associated with this service.    Last A1c value was 8.5% done 2024. Since last visit pt has resumed CGM and insulin. Glucose trends improving per CGM with GMI of 7.4% for 30 days.      Pt often stays long periods in [place] before returning to [place] and unsure when he will be returning to [facility].     DM Hx:  Type: 2 (antibodies negative )  Onset:     Current Medications:   Metformin 500 m tabs twice daily with meals  Jardiance 10 mg daily  Tresiba 10 units daily     Previous Medications:   Humalog  Levemir  Novolin R    Complications/Co-morbidities:   Retinopathy  CAD  Hyperlipidemia  Hx of CABG    Hospitalizations for diabetes: No  History of pancreatitis: No  Episodes of hypoglycemia: No  Hypoglycemia management: Rule of 15      Modifying factors:  Medication/follow up adherence: Yes  Dietary compliance: Fair  Exercise: Yes; comment: walking    Glucose monitoring:   CGM Ced    Personal  Freestyle Ced CGM  Analysis of data: 24 - 7/10/24  Active wear time: 96% (Goal 70%)  Sensor download: full report  in media  Average glucose : 178 mg/dl   GMI: 7.6%     CV (coefficient of variation) : 23.2%      31% time above 180mg /dl (Goal < 25%)  7% time above 250 mg/dl (Goal < 5%)  62% time in target range:  mg/dl (Goal > 70%)  0% time below target range: 70mg/dl (Goal < 4%)     Evaluation   1. Baseline glucose in target range  2. postprandial elevation with return to baseline  3. low frequency of hypoglycemia      30 day report:      Past  History:   He  has a past medical history of Diabetes (HCC), High cholesterol, Other and unspecified hyperlipidemia, Stress, and Wears glasses.   His family history is not on file.   He  reports that he quit smoking about 2 years ago. His smoking use included cigarettes. He started smoking about 22 years ago. He has a 20 pack-year smoking history. He has never used smokeless tobacco. He reports that he does not drink alcohol and does not use drugs.     He has No Known Allergies.     Current Outpatient Medications on File Prior to Visit   Medication Sig    metFORMIN 500 MG Oral Tab Take 2 tablets (1,000 mg total) by mouth 2 (two) times daily with meals.    atorvastatin 80 MG Oral Tab Take 1 tablet (80 mg total) by mouth nightly.    insulin degludec (TRESIBA FLEXTOUCH) 100 UNIT/ML Subcutaneous Solution Pen-injector Inject 12 Units into the skin daily.    Insulin Pen Needle 32G X 4 MM Does not apply Misc Use daily with insulin pen    metoprolol tartrate 50 MG Oral Tab Take 1 tablet (50 mg total) by mouth 2x Daily(Beta Blocker).    [DISCONTINUED] Continuous Glucose Sensor (FREESTYLE VERONIKA 3 SENSOR) Does not apply Misc 1 every 14 days    aspirin 81 MG Oral Tab EC Take 1 tablet (81 mg total) by mouth daily.    Ferrous Sulfate 325 (65 Fe) MG Oral Tab Take 1 tablet (325 mg total) by mouth daily with breakfast.    acetaminophen 500 MG Oral Tab Take 500 mg by mouth every 6 (six) hours as needed for Pain. (Patient not taking: Reported on 12/15/2022)    Glucose Blood (ONETOUCH VERIO) In Vitro Strip 4x daily     No current facility-administered medications on file prior to visit.         BP Readings from Last 3 Encounters:   05/30/24 108/66   01/20/23 116/70   12/15/22 114/68     Wt Readings from Last 3 Encounters:   05/30/24 137 lb 3.2 oz (62.2 kg)   01/20/23 133 lb (60.3 kg)   12/15/22 136 lb (61.7 kg)       LABS:      HGBA1C:    Lab Results   Component Value Date    A1C 8.5 (A) 05/30/2024    A1C 7.3 (A) 12/15/2022    A1C 7.6  (A) 10/19/2022     (H) 06/22/2022       CMP  (most recent labs)   Lab Results   Component Value Date     (H) 10/31/2022    BUN 9 10/31/2022    CREATSERUM 0.84 10/31/2022    ANIONGAP 4 10/31/2022    EGFRCR 106 10/31/2022     08/30/2015    GFRNAA 118 06/26/2022    GFRAA 136 06/26/2022    CA 9.4 10/31/2022    OSMOCALC 290 10/31/2022    ALKPHO 96 10/31/2022    AST 15 10/31/2022    ALT 26 10/31/2022    BILT 1.0 10/31/2022    TP 7.0 10/31/2022    ALB 3.9 10/31/2022    GLOBULIN 3.1 10/31/2022     10/31/2022    K 4.3 10/31/2022     10/31/2022    CO2 27.0 10/31/2022         Lipids  (most recent labs)   Lab Results   Component Value Date/Time    CHOLEST 88 08/05/2022 08:39 AM    TRIG 121 08/05/2022 08:39 AM    HDL 30 (L) 08/05/2022 08:39 AM    LDL 36 08/05/2022 08:39 AM    NONHDLC 58 08/05/2022 08:39 AM         Thyroid  (most recent labs)   Lab Results   Component Value Date/Time    TSH 1.100 06/22/2022 12:08 PM        Micro Albumen/Creatinine:  No results found for: \"MALBP\", \"CREUR\", \"MICROALBCREA\"       REVIEW OF SYSTEMS:   GENERAL HEALTH: feels well otherwise  SKIN: denies any unusual skin lesions or rashes  EYES: denies vision changes  RESPIRATORY: denies shortness of breath with exertion  CARDIOVASCULAR: denies chest pain on exertion  GI: denies abdominal pain, N/V/D  NEURO: denies headaches and denies numbness and tingling to extremities  ENDO: denies polyuria, polyphagia, polydipsia     EXAM:     There were no vitals filed for this visit.  Vitals reviewed  Constitutional: Normal appearance, no acute distress  Pulmonary: Pulmonary effort is normal  Neurologic: Alert and oriented  Psychiatric: Normal mood and affect    ASSESSMENT AND PLAN:   As for his Diabetes, it is improved, needs improvement. Pt reminded to complete labs ASAP. Pt states he is using abel to once again review food he should not be eating and to help adjust portion sizes. Glucose improving and feels he can adjust diet  further so will CPM. Pt declines MNT as he just completed this in [place]. If no improvement or glucose worsening pt encouraged to call and we can add Rybelsus. Have avoided GLP-1 however thus far as he often travels to [place] for long periods and this drug class is not affordable there.     Medications:   Continue:   Metformin 500 m tabs twice daily with meals  Jardiance 10 mg daily  Tresiba 10 units daily       Please call if you have 2 glucose readings less than 80 mg/dl in 1 week so we can adjust your medications.      Recommendations:  Continue Ced CGM Reminded BGM 4 x daily if not wearing CGM. Reviewed s/s and treatment of hypoglycemia (on AVS)   Reminded to get annual retinal exam  Check feet daily  Check labs as ordered    DM health maintenance:  Last dilated eye exam: No data recorded Exam shows retinopathy? No data recorded  Last diabetic foot exam: Last Foot Exam: 24    Nephropathy screening: Pt does not have a history of CKD and/or proteinuria.   Pt is not on ace/arb. BP is well controlled  Continue present management.  and Check labs for any new nephropathy.  Pt is on high intensity statin. Cholesterol is well controlled  Will check labs.   Continue present management.  Patient is on aspirin therapy. The patient's ASCVD 10 year risk score is Score not calculated.   Continue present management.  Patient is not on GLP-1/GIP class medication.   If pt returns to [place] would be cost barrier, also currently low BMI  Patient is on SGLT2i class medication.   Continue present management.     The patient indicates understanding of these issues and agrees to the plan.  Refills sent at time of office visit.    Diagnoses and all orders for this visit:    Type 2 diabetes mellitus with hyperglycemia, with long-term current use of insulin ([facility])  -     Endocrine Referral - In Network  -     empagliflozin (JARDIANCE) 10 MG Oral Tab; Take 1 tablet (10 mg total) by mouth daily.  -     Continuous Glucose Sensor  (FREESTYLE VERONIKA 3 SENSOR) Does not apply Misc; 1 every 14 days  -     glucagon (BAQSIMI ONE PACK) 3 MG/DOSE Nasal nasal powder; 3 mg by Nasal route once as needed for Low blood glucose (severe hypoglycemia <55 mg/dl).    Coronary artery disease involving native coronary artery of native heart without angina pectoris  -     Endocrine Referral - In Network  -     empagliflozin (JARDIANCE) 10 MG Oral Tab; Take 1 tablet (10 mg total) by mouth daily.         Do the chronicity and potential for complications of disease pt will need ongoing monitoring.   Return in about 2 months (around 9/10/2024) for follow up.    Spent 30 min obtaining patient history, evaluating patient, reviewing blood glucose trends, discussing treatment options, lifestyle modifications and completing documentation -this time does not including sensor interpretation time if applicable.   The risks and benefits of my recommendations, as well as other treatment options were discussed with the patient today. Questions were also answered to the best of my knowledge.    Amber DUVAL

## 2024-07-10 NOTE — PATIENT INSTRUCTIONS
Summary from visit:   Last A1c value was 8.5% done 2024.  Please complete labs as soon as possible.   Nutrition in the fast candido (fast food) link:   [email or web address]-facts-about-fast-food  Diabetes Medications:   Continue:   Metformin 500 m tabs twice daily with meals  Jardiance 10 mg daily  Tresiba 10 units daily         It is important to take all of your medications as prescribed. Please call me if you cannot get the prescriptions filled or are having issues with refills.   Also, please call me if you have any issues with medication questions, side effects, dosing questions or problems with your blood sugar trends BEFORE CHANGING OR STOPPING ANY MEDICATIONS.    Remember to bring your glucose meter or blood sugar logbook to every appointment here at the diabetes center.   In order for me to determine any patterns in your blood sugars, you will need to test your blood sugar 1 times daily.   Please call with any concerns and Call if 2 glucose readings <80 mg/dl in 1 week so medication adjustments can be made. , Complete ordered labs, and Schedule your annual diabetic eye exam prior to your next visit.   Return in about 2 months (around 9/10/2024) for follow up.  ---------------------------------------------------------------------------------------------------------------------------------------------------    Reminders:  The A1C:  The A1C test provides us with your average blood sugar for the past 3 months. Keeping an A1C less than 7% for most people helps reduce or delay health problems that are related to diabetes. We sometimes make exceptions based on age, health history and other factors.     Blood sugar targets:  Before breakfast:   (preferably less than 110)  2 hours After meals: less than 180 (preferably less than 150)   Call for persistent blood sugars  less than  75 or more than  200.   Blood sugars greater than 200 are not acceptable to reach your goal of improving diabetes      Hypoglycemia:    Watch for low blood sugars: (less than 70)  Symptoms of low blood sugar:   Shakiness or dizziness  Cold, clammy skin or sweating  Feeling hungry  Headache  Nervousness  A hard, fast heartbeat  Weakness  Confusion or irritability  Blurred eyesight  Having nightmares or waking up confused or sweating  Numbness or tingling in the lips or tongue    Treatment of Low Blood sugar Action Plan  1. Check blood glucose to be sure that it is low. You can’t always go by symptoms. If in doubt, treat your low blood glucose anyway.  2. Take 15 grams of carbohydrate (carb). Here are some choices:  4 oz. regular fruit juice  3-4 glucose tablets  6 oz. regular soda   7-8 jelly beans  3. Recheck blood glucose after 10-15 minutes. If blood glucose is still low (less than 70 mg/dl) repeat the treatment (step 2).  4. If your next meal is more than one hour away, eat a small snack.  5. If you’re not sure what caused your low blood glucose, call your healthcare provider.  6. Always check your blood glucose before you drive           Diabetes Center Refill policy:     Allow 2-3 business days for refills  Contact your pharmacy at least 5 days prior to running out of medication and have them send an electronic request or submit request through the “request refill” option in your [employer] account.  Refills are not addressed after hours or on weekends; covering providers  do not authorize routine medications on weekends.  If your prescription is due for a refill, you may be due for a follow up appointment. This may impact the ability for you to get a 90 supply if requested.   To best provide you care, patients receiving routine medications need to be seen at least twice per year however if the A1C is above 8% you will be need to be seen more frequently.   Yearly blood work may also required for many  medications to insure safe prescribing. If you are due for labs, you will have 30 days to complete the  requested labs before future refills are authorized.   In the event that your preferred pharmacy does not have the requested medication in stock (e.g. Backordered), it is your responsibility to find another pharmacy that has the requested medication available.  We will gladly send a new prescription to that pharmacy at your request.       More and more people are living long and healthy lives with diabetes. We are here to help you manage your diabetes. Let’s work together to make a plan that you can balance in your daily life. Please continue with your primary care physician/provider for your routine health care maintenance.   Thank you,   Amber Guidry [facility]

## 2024-08-11 PROBLEM — E11.8 DIABETES MELLITUS WITH COMPLICATION (HCC): Status: ACTIVE | Noted: 2024-08-11

## 2024-08-11 PROBLEM — E11.9 TYPE 2 DIABETES MELLITUS WITHOUT COMPLICATION, WITHOUT LONG-TERM CURRENT USE OF INSULIN (HCC): Chronic | Status: RESOLVED | Noted: 2022-06-22 | Resolved: 2024-08-11

## 2024-08-11 PROBLEM — E78.5 HYPERLIPIDEMIA ASSOCIATED WITH TYPE 2 DIABETES MELLITUS (HCC): Status: ACTIVE | Noted: 2022-07-26

## 2024-08-11 PROBLEM — E11.69 HYPERLIPIDEMIA ASSOCIATED WITH TYPE 2 DIABETES MELLITUS (HCC): Status: ACTIVE | Noted: 2022-07-26

## 2024-08-12 ENCOUNTER — LAB ENCOUNTER (OUTPATIENT)
Dept: LAB | Age: 54
End: 2024-08-12
Attending: INTERNAL MEDICINE
Payer: COMMERCIAL

## 2024-08-12 ENCOUNTER — OFFICE VISIT (OUTPATIENT)
Dept: INTERNAL MEDICINE CLINIC | Facility: CLINIC | Age: 54
End: 2024-08-12
Payer: COMMERCIAL

## 2024-08-12 VITALS
RESPIRATION RATE: 16 BRPM | TEMPERATURE: 98 F | OXYGEN SATURATION: 97 % | DIASTOLIC BLOOD PRESSURE: 68 MMHG | BODY MASS INDEX: 18.82 KG/M2 | HEIGHT: 69.5 IN | SYSTOLIC BLOOD PRESSURE: 110 MMHG | WEIGHT: 130 LBS | HEART RATE: 71 BPM

## 2024-08-12 DIAGNOSIS — I25.10 CORONARY ARTERY DISEASE INVOLVING NATIVE CORONARY ARTERY OF NATIVE HEART WITHOUT ANGINA PECTORIS: ICD-10-CM

## 2024-08-12 DIAGNOSIS — Z12.5 PROSTATE CANCER SCREENING: ICD-10-CM

## 2024-08-12 DIAGNOSIS — Z00.00 ROUTINE GENERAL MEDICAL EXAMINATION AT A HEALTH CARE FACILITY: ICD-10-CM

## 2024-08-12 DIAGNOSIS — E11.8 DIABETES MELLITUS WITH COMPLICATION (HCC): Primary | ICD-10-CM

## 2024-08-12 DIAGNOSIS — E11.8 DIABETES MELLITUS WITH COMPLICATION (HCC): ICD-10-CM

## 2024-08-12 DIAGNOSIS — Z12.11 COLON CANCER SCREENING: ICD-10-CM

## 2024-08-12 DIAGNOSIS — Z00.00 ANNUAL PHYSICAL EXAM: Primary | ICD-10-CM

## 2024-08-12 LAB
ALBUMIN SERPL-MCNC: 4.8 G/DL (ref 3.2–4.8)
ALBUMIN/GLOB SERPL: 2 {RATIO} (ref 1–2)
ALP LIVER SERPL-CCNC: 98 U/L
ALT SERPL-CCNC: 18 U/L
ANION GAP SERPL CALC-SCNC: 8 MMOL/L (ref 0–18)
AST SERPL-CCNC: 23 U/L (ref ?–34)
BASOPHILS # BLD AUTO: 0.03 X10(3) UL (ref 0–0.2)
BASOPHILS NFR BLD AUTO: 0.5 %
BILIRUB SERPL-MCNC: 1.3 MG/DL (ref 0.3–1.2)
BUN BLD-MCNC: 10 MG/DL (ref 9–23)
CALCIUM BLD-MCNC: 9.9 MG/DL (ref 8.7–10.4)
CHLORIDE SERPL-SCNC: 106 MMOL/L (ref 98–112)
CHOLEST SERPL-MCNC: 115 MG/DL (ref ?–200)
CO2 SERPL-SCNC: 25 MMOL/L (ref 21–32)
COMPLEXED PSA SERPL-MCNC: 0.78 NG/ML (ref ?–4)
CREAT BLD-MCNC: 0.81 MG/DL
EGFRCR SERPLBLD CKD-EPI 2021: 106 ML/MIN/1.73M2 (ref 60–?)
EOSINOPHIL # BLD AUTO: 0.15 X10(3) UL (ref 0–0.7)
EOSINOPHIL NFR BLD AUTO: 2.3 %
ERYTHROCYTE [DISTWIDTH] IN BLOOD BY AUTOMATED COUNT: 12.4 %
EST. AVERAGE GLUCOSE BLD GHB EST-MCNC: 186 MG/DL (ref 68–126)
FASTING PATIENT LIPID ANSWER: NO
FASTING STATUS PATIENT QL REPORTED: NO
GLOBULIN PLAS-MCNC: 2.4 G/DL (ref 2–3.5)
GLUCOSE BLD-MCNC: 148 MG/DL (ref 70–99)
HBA1C MFR BLD: 8.1 % (ref ?–5.7)
HCT VFR BLD AUTO: 47 %
HDLC SERPL-MCNC: 28 MG/DL (ref 40–59)
HGB BLD-MCNC: 15.9 G/DL
IMM GRANULOCYTES # BLD AUTO: 0.01 X10(3) UL (ref 0–1)
IMM GRANULOCYTES NFR BLD: 0.2 %
LDLC SERPL CALC-MCNC: 65 MG/DL (ref ?–100)
LYMPHOCYTES # BLD AUTO: 2.09 X10(3) UL (ref 1–4)
LYMPHOCYTES NFR BLD AUTO: 31.9 %
MCH RBC QN AUTO: 30.8 PG (ref 26–34)
MCHC RBC AUTO-ENTMCNC: 33.8 G/DL (ref 31–37)
MCV RBC AUTO: 90.9 FL
MONOCYTES # BLD AUTO: 0.5 X10(3) UL (ref 0.1–1)
MONOCYTES NFR BLD AUTO: 7.6 %
NEUTROPHILS # BLD AUTO: 3.77 X10 (3) UL (ref 1.5–7.7)
NEUTROPHILS # BLD AUTO: 3.77 X10(3) UL (ref 1.5–7.7)
NEUTROPHILS NFR BLD AUTO: 57.5 %
NONHDLC SERPL-MCNC: 87 MG/DL (ref ?–130)
OSMOLALITY SERPL CALC.SUM OF ELEC: 290 MOSM/KG (ref 275–295)
PLATELET # BLD AUTO: 224 10(3)UL (ref 150–450)
POTASSIUM SERPL-SCNC: 4.2 MMOL/L (ref 3.5–5.1)
PROT SERPL-MCNC: 7.2 G/DL (ref 5.7–8.2)
RBC # BLD AUTO: 5.17 X10(6)UL
SODIUM SERPL-SCNC: 139 MMOL/L (ref 136–145)
TRIGL SERPL-MCNC: 118 MG/DL (ref 30–149)
TSI SER-ACNC: 1.31 MIU/ML (ref 0.55–4.78)
VLDLC SERPL CALC-MCNC: 18 MG/DL (ref 0–30)
WBC # BLD AUTO: 6.6 X10(3) UL (ref 4–11)

## 2024-08-12 PROCEDURE — 36415 COLL VENOUS BLD VENIPUNCTURE: CPT

## 2024-08-12 PROCEDURE — 99396 PREV VISIT EST AGE 40-64: CPT | Performed by: INTERNAL MEDICINE

## 2024-08-12 PROCEDURE — 80053 COMPREHEN METABOLIC PANEL: CPT

## 2024-08-12 PROCEDURE — 85025 COMPLETE CBC W/AUTO DIFF WBC: CPT

## 2024-08-12 PROCEDURE — 80061 LIPID PANEL: CPT

## 2024-08-12 PROCEDURE — 84443 ASSAY THYROID STIM HORMONE: CPT

## 2024-08-12 PROCEDURE — 83036 HEMOGLOBIN GLYCOSYLATED A1C: CPT

## 2024-08-12 RX ORDER — METOPROLOL TARTRATE 50 MG/1
50 TABLET, FILM COATED ORAL
Qty: 60 TABLET | Refills: 0 | Status: SHIPPED | OUTPATIENT
Start: 2024-08-12

## 2024-08-12 RX ORDER — EMPAGLIFLOZIN 25 MG/1
25 TABLET, FILM COATED ORAL DAILY
Qty: 90 TABLET | Refills: 1 | Status: SHIPPED | OUTPATIENT
Start: 2024-08-12

## 2024-08-12 RX ORDER — FINASTERIDE 5 MG/1
5 TABLET, FILM COATED ORAL DAILY
Qty: 90 TABLET | Refills: 3 | Status: SHIPPED | OUTPATIENT
Start: 2024-08-12 | End: 2025-08-07

## 2024-08-12 NOTE — TELEPHONE ENCOUNTER
Requested Prescriptions     Pending Prescriptions Disp Refills    metoprolol tartrate 50 MG Oral Tab 60 tablet 2     Sig: Take 1 tablet (50 mg total) by mouth 2x Daily(Beta Blocker).     HGBA1C:    Lab Results   Component Value Date    A1C 8.5 (A) 05/30/2024    A1C 7.3 (A) 12/15/2022    A1C 7.6 (A) 10/19/2022     (H) 06/22/2022     Your Appointments      Monday August 12, 2024 2:45 PM  Physical - Established with Epi Nguyen MD  [facility], [address] [place] [address] ([address] & [facility]) [address] [place] [address]  [facility] [id number] [phone]        Wednesday September 11, 2024 12:15 PM  Exam - New Patient with MUKUND Ruvalcaba  [facility] ([address]  [facility] [address]  [phone]              Last OFFICE VISIT: 7--CORI-televist    Last Refill:  5--60 tabs with 2 refills    -Patient is scheduled with Luna 9-2024   See note below - please advise on labs.     Christiano is still in process.

## 2024-08-12 NOTE — PROGRESS NOTES
Subjective:   Patient ID: Shaq Boone is a 54 year old male.    HPI  Shaq Boone is a 54 year old male who presents for a complete physical exam.   HPI:   Pt complains of nothing  [place] 1[place].   Reports he has been adherent with meds    PAST MEDICAL, SOCIAL, FAMILY HISTORIES REVIEWED WITH PT  .    Immunization History   Administered Date(s) Administered    Covid-19 Vaccine Pfizer 30 mcg/0.3 ml 04/14/2021, 05/07/2021    Covid-19 Vaccine Pfizer Bivalent 30mcg/0.3mL 12/30/2022    Covid-19 Vaccine Pfizer [place]-Sucrose 30 mcg/0.3 ml 07/26/2022    FLUZONE 6 months and older PFS 0.5 ml (44972) 10/06/2012, 12/30/2022    Influenza 10/06/2012, 12/30/2022    Pneumococcal Conjugate PCV20 07/26/2022    TDAP 07/26/2022     Wt Readings from Last 6 Encounters:   08/12/24 130 lb (59 kg)   05/30/24 137 lb 3.2 oz (62.2 kg)   01/20/23 133 lb (60.3 kg)   12/15/22 136 lb (61.7 kg)   10/19/22 137 lb 3.2 oz (62.2 kg)   09/14/22 132 lb (59.9 kg)     Body mass index is 18.92 kg/m².     Lab Results   Component Value Date     (H) 10/31/2022     (H) 08/05/2022     (H) 06/26/2022     Lab Results   Component Value Date    CHOLEST 88 08/05/2022    CHOLEST 186 06/23/2022    CHOLEST 228 (H) 06/22/2022     Lab Results   Component Value Date    HDL 30 (L) 08/05/2022    HDL 22 (L) 06/23/2022    HDL 32 (L) 06/22/2022     Lab Results   Component Value Date    LDL 36 08/05/2022     (H) 06/23/2022     (H) 06/22/2022     Lab Results   Component Value Date    AST 15 10/31/2022    AST 26 08/05/2022    AST 12 (L) 06/26/2022     Lab Results   Component Value Date    ALT 26 10/31/2022    ALT 51 08/05/2022    ALT 12 (L) 06/26/2022     No results found for: \"PSA\"     Current Outpatient Medications   Medication Sig Dispense Refill    metoprolol tartrate 50 MG Oral Tab Take 1 tablet (50 mg total) by mouth 2x Daily(Beta Blocker). 60 tablet 0    finasteride 5 MG Oral Tab Take 1 tablet (5 mg total) by  mouth daily. 90 tablet 3    empagliflozin (JARDIANCE) 10 MG Oral Tab Take 1 tablet (10 mg total) by mouth daily. 90 tablet 1    Continuous Glucose Sensor (FREESTYLE VERONIKA 3 SENSOR) Does not apply Misc 1 every 14 days 2 each 1    metFORMIN 500 MG Oral Tab Take 2 tablets (1,000 mg total) by mouth 2 (two) times daily with meals. 360 tablet 0    atorvastatin 80 MG Oral Tab Take 1 tablet (80 mg total) by mouth nightly. 90 tablet 0    insulin degludec (TRESIBA FLEXTOUCH) 100 UNIT/ML Subcutaneous Solution Pen-injector Inject 12 Units into the skin daily. 15 mL 1    Insulin Pen Needle 32G X 4 MM Does not apply Misc Use daily with insulin pen 90 each 0    aspirin 81 MG Oral Tab EC Take 1 tablet (81 mg total) by mouth daily. 30 tablet 5    Ferrous Sulfate 325 (65 Fe) MG Oral Tab Take 1 tablet (325 mg total) by mouth daily with breakfast.  0    Glucose Blood (ONETOUCH VERIO) In Vitro Strip 4x daily 125 strip 5      Past Medical History:    Diabetes (HCC)    High cholesterol    Other and unspecified hyperlipidemia    Stress    Wears glasses      Past Surgical History:   Procedure Laterality Date    Cabg        History reviewed. No pertinent family history.   Social History:  Social History     Socioeconomic History    Marital status:    Tobacco Use    Smoking status: Former     Current packs/day: 0.00     Average packs/day: 1 pack/day for 20.0 years (20.0 ttl pk-yrs)     Types: Cigarettes     Start date: 2002     Quit date: 2022     Years since quittin.1    Smokeless tobacco: Never   Vaping Use    Vaping status: Never Used   Substance and Sexual Activity    Alcohol use: Never    Drug use: Never      Occ: yes. : yes. Children: yes.   Exercise: once per week,  twice per week.  Diet: watches fats closely and watches sugar closely     REVIEW OF SYSTEMS:   A comprehensive 10 point review of systems was completed.     Pertinent positives and negatives noted in the HPI.      EXAM:   /68   Pulse 71    Temp 98.3 °F (36.8 °C)   Resp 16   Ht 5' 9.5\" (1.765 m)   Wt 130 lb (59 kg)   SpO2 97%   BMI 18.92 kg/m²   Body mass index is 18.92 kg/m².   GENERAL: well developed, well nourished,in no apparent distress  SKIN: no rashes,no suspicious lesions  HEENT: atraumatic, normocephalic,ears and throat are clear  EYES:PERRLA, EOMI,conjunctiva are clear  NECK: supple,no adenopathy,no bruits  LUNGS: clear to auscultation  CARDIO: RRR without murmur  GI: good BS's,no masses, HSM or tenderness  : deferred  MUSCULOSKELETAL: back is not tender,FROM of the back  EXTREMITIES: no cyanosis, clubbing or edema  NEURO: Oriented times three,cranial nerves are intact,motor and sensory are grossly intact,   Bilateral barefoot skin diabetic exam is normal, visualized feet and the appearance is normal.  Bilateral monofilament/sensation of both feet is normal.  Pulsation pedal pulse exam of both lower legs/feet is normal as well.        ASSESSMENT AND PLAN:   Shaq Boone is a 54 year old male who presents for a complete physical exam.   Body mass index is 18.92 kg/m²., recommended low fat diet and aerobic exercise 30 minutes three times weekly. Health maintenance, will check fasting Lipids, CMP, CBC and PSA, A1C.   Pt referred for screening colonoscopy.   Pt info handouts given for: exercise, low fat diet,    The patient indicates understanding of  these issues and agrees to the plan.  The patient is asked to return for CPX in 12 m. Rtc on 6 m for med check    History/Other:   Review of Systems  Current Outpatient Medications   Medication Sig Dispense Refill    metoprolol tartrate 50 MG Oral Tab Take 1 tablet (50 mg total) by mouth 2x Daily(Beta Blocker). 60 tablet 0    finasteride 5 MG Oral Tab Take 1 tablet (5 mg total) by mouth daily. 90 tablet 3    empagliflozin (JARDIANCE) 10 MG Oral Tab Take 1 tablet (10 mg total) by mouth daily. 90 tablet 1    Continuous Glucose Sensor (FREESTYLE VERONIKA 3 SENSOR) Does not apply Misc 1 every  14 days 2 each 1    metFORMIN 500 MG Oral Tab Take 2 tablets (1,000 mg total) by mouth 2 (two) times daily with meals. 360 tablet 0    atorvastatin 80 MG Oral Tab Take 1 tablet (80 mg total) by mouth nightly. 90 tablet 0    insulin degludec (TRESIBA FLEXTOUCH) 100 UNIT/ML Subcutaneous Solution Pen-injector Inject 12 Units into the skin daily. 15 mL 1    Insulin Pen Needle 32G X 4 MM Does not apply Misc Use daily with insulin pen 90 each 0    aspirin 81 MG Oral Tab EC Take 1 tablet (81 mg total) by mouth daily. 30 tablet 5    Ferrous Sulfate 325 (65 Fe) MG Oral Tab Take 1 tablet (325 mg total) by mouth daily with breakfast.  0    Glucose Blood (ONETOUCH VERIO) In Vitro Strip 4x daily 125 strip 5     Allergies:No Known Allergies    Objective:   Physical Exam    Assessment & Plan:   1. Annual physical exam    2. Diabetes mellitus with complication (HCC)    3. Coronary artery disease involving native coronary artery of native heart without angina pectoris    4. Routine general medical examination at a health care facility    5. Prostate cancer screening    6. Colon cancer screening        Orders Placed This Encounter   Procedures    CBC With Differential With Platelet    Comp Metabolic Panel (14)    Hemoglobin A1C    Lipid Panel    TSH W Reflex To Free T4    Urinalysis, Routine    PSA Total, Screen    Microalb/Creat Ratio, Random Urine       Meds This Visit:  Requested Prescriptions     Signed Prescriptions Disp Refills    finasteride 5 MG Oral Tab 90 tablet 3     Sig: Take 1 tablet (5 mg total) by mouth daily.       Imaging & Referrals:  OPHTHALMOLOGY - INTERNAL  GASTRO - INTERNAL

## 2024-08-14 DIAGNOSIS — E11.65 TYPE 2 DIABETES MELLITUS WITH HYPERGLYCEMIA, WITH LONG-TERM CURRENT USE OF INSULIN (HCC): ICD-10-CM

## 2024-08-14 DIAGNOSIS — Z79.4 TYPE 2 DIABETES MELLITUS WITH HYPERGLYCEMIA, WITH LONG-TERM CURRENT USE OF INSULIN (HCC): ICD-10-CM

## 2024-08-14 NOTE — TELEPHONE ENCOUNTER
Received fax from [facility] requesting refill of pen needles, pended  Requested Prescriptions     Pending Prescriptions Disp Refills    Insulin Pen Needle 32G X 4 MM Does not apply Misc 100 each 0     Sig: Use daily with insulin pen     Your appointments       Date & Time Appointment Department ([place])    Sep 11, 2024 12:15 PM CDT Exam - New Patient with Luna Lemons APRN [facility] ([facility])              [address] [address]  [phone]          Last A1c value was 8.1% done 8/12/2024.    Refill 5/30/24  LOV 7/10/24 with CORI, has upcoming appt with Luna

## 2024-08-28 DIAGNOSIS — E11.65 TYPE 2 DIABETES MELLITUS WITH HYPERGLYCEMIA, WITH LONG-TERM CURRENT USE OF INSULIN (HCC): ICD-10-CM

## 2024-08-28 DIAGNOSIS — Z79.4 TYPE 2 DIABETES MELLITUS WITH HYPERGLYCEMIA, WITH LONG-TERM CURRENT USE OF INSULIN (HCC): ICD-10-CM

## 2024-08-28 DIAGNOSIS — E78.2 MIXED HYPERLIPIDEMIA: ICD-10-CM

## 2024-08-28 RX ORDER — ATORVASTATIN CALCIUM 80 MG/1
80 TABLET, FILM COATED ORAL NIGHTLY
Qty: 90 TABLET | Refills: 0 | Status: SHIPPED | OUTPATIENT
Start: 2024-08-28

## 2024-08-28 NOTE — TELEPHONE ENCOUNTER
Received refill request for medication     Requested Prescriptions     Pending Prescriptions Disp Refills    metFORMIN 500 MG Oral Tab 360 tablet 0     Sig: Take 2 tablets (1,000 mg total) by mouth 2 (two) times daily with meals.    atorvastatin 80 MG Oral Tab 90 tablet 0     Sig: Take 1 tablet (80 mg total) by mouth nightly.     Future Appointments   Date Time Provider Department Center   9/11/2024 12:15 PM Luna Lemons APRN [facility] [place]     Last A1c value was 8.1% done 8/12/2024.  Refill   5/30/24 [place]   LOV 7/10/24 Anjelica

## 2024-09-05 DIAGNOSIS — I25.10 CORONARY ARTERY DISEASE INVOLVING NATIVE CORONARY ARTERY OF NATIVE HEART WITHOUT ANGINA PECTORIS: ICD-10-CM

## 2024-09-05 RX ORDER — METOPROLOL TARTRATE 50 MG
50 TABLET ORAL
Qty: 60 TABLET | Refills: 0 | Status: SHIPPED | OUTPATIENT
Start: 2024-09-05

## 2024-09-05 NOTE — TELEPHONE ENCOUNTER
Requested Prescriptions     Pending Prescriptions Disp Refills    metoprolol tartrate 50 MG Oral Tab 60 tablet 0     Sig: Take 1 tablet (50 mg total) by mouth 2x Daily(Beta Blocker).     HGBA1C:    Lab Results   Component Value Date    A1C 8.1 (H) 08/12/2024    A1C 8.5 (A) 05/30/2024    A1C 7.3 (A) 12/15/2022     (H) 08/12/2024       Your Appointments      Wednesday September 11, 2024 12:15 PM  Exam - New Patient with MUKUND Ruvalcaba  [facility] ([facility]) [address] [place] [id number] [phone]             LAST OFFICE VISIT: 7--CORI    Hast upcoming appointment with IVY     Last Refill:  8--60 tabs with 0 refills-

## 2024-09-11 ENCOUNTER — OFFICE VISIT (OUTPATIENT)
Facility: CLINIC | Age: 54
End: 2024-09-11
Payer: COMMERCIAL

## 2024-09-11 ENCOUNTER — TELEPHONE (OUTPATIENT)
Facility: CLINIC | Age: 54
End: 2024-09-11

## 2024-09-11 VITALS
HEIGHT: 69.5 IN | WEIGHT: 130 LBS | OXYGEN SATURATION: 97 % | DIASTOLIC BLOOD PRESSURE: 64 MMHG | BODY MASS INDEX: 18.82 KG/M2 | SYSTOLIC BLOOD PRESSURE: 108 MMHG | HEART RATE: 74 BPM

## 2024-09-11 DIAGNOSIS — Z79.4 TYPE 2 DIABETES MELLITUS WITH HYPERGLYCEMIA, WITH LONG-TERM CURRENT USE OF INSULIN (HCC): Primary | ICD-10-CM

## 2024-09-11 DIAGNOSIS — E11.65 TYPE 2 DIABETES MELLITUS WITH HYPERGLYCEMIA, WITH LONG-TERM CURRENT USE OF INSULIN (HCC): Primary | ICD-10-CM

## 2024-09-11 DIAGNOSIS — E11.3293 NONPROLIFERATIVE DIABETIC RETINOPATHY OF BOTH EYES (HCC): ICD-10-CM

## 2024-09-11 DIAGNOSIS — E11.69 HYPERLIPIDEMIA ASSOCIATED WITH TYPE 2 DIABETES MELLITUS (HCC): ICD-10-CM

## 2024-09-11 DIAGNOSIS — E78.5 HYPERLIPIDEMIA ASSOCIATED WITH TYPE 2 DIABETES MELLITUS (HCC): ICD-10-CM

## 2024-09-11 DIAGNOSIS — I10 PRIMARY HYPERTENSION: ICD-10-CM

## 2024-09-11 DIAGNOSIS — I25.10 CORONARY ARTERY DISEASE INVOLVING NATIVE CORONARY ARTERY OF NATIVE HEART WITHOUT ANGINA PECTORIS: ICD-10-CM

## 2024-09-11 DIAGNOSIS — E55.9 VITAMIN D DEFICIENCY: ICD-10-CM

## 2024-09-11 PROCEDURE — 99215 OFFICE O/P EST HI 40 MIN: CPT | Performed by: NURSE PRACTITIONER

## 2024-09-11 PROCEDURE — 95250 CONT GLUC MNTR PHYS/QHP EQP: CPT | Performed by: NURSE PRACTITIONER

## 2024-09-11 RX ORDER — BLOOD-GLUCOSE SENSOR
EACH MISCELLANEOUS
Qty: 2 EACH | Refills: 3 | Status: SHIPPED | OUTPATIENT
Start: 2024-09-11

## 2024-09-11 NOTE — PROGRESS NOTES
[facility] Note    Name: Shaq Boone    Date: 24    Shaq Boone is a 54 year old male who presents for evaluation of T2DM management.     Chief complaint: New Patient (NP here to establish care for DM2, per pt no current concerns or sx.//Last A1c value was 8.1% done 2024. )       Subjective:   DM hx:  -Diagnosed with diabetes in  while he had NSTEMI/CAD s/p CABG at that time, antibodies negative    C peptide 1.46- 22  -Family history- none known      Initial HPI consult - 24: Last office visit for DM mgmt of the pt:  7/10/24  Mgmt by: Amber Guidry NP from [facility]  DM meds at first office visit:Metformin 500 m tabs twice daily with meals, Jardiance 25 mg daily ( 10 days ago increased by cardiology), Tresiba 10 units qam    episodes of hypoglycemia: No    Continuous Glucose Monitoring Interpretation  Shaq Boone has undergone continuous glucose monitoring with the eco[employer] Ced 3 CGM with phone (connected to clinic profile).  The blood glucose tracings were evaluated for two weeks prior to office visit. Blood glucose tracings demonstrated no significant hyperglycemia. There were no significant hypoglycemia notedduring the weeks of evaluation.    TIR 78%, GMI 7.1%, Lows 0    -Re: potential DM medication contraindication (if positive, checkbox selected):  [] History of pancreatitis- denies   [] Personal/fam hx of medullary thyroid cancer/MEN2- denies   [] History of recent/frequent UTI/yeast infxn- denies   [] Previous amputation related to diabetes-denies     -Presence of associated DM complications (if positive, checkbox selected):  [x] Macrovascular complications (CAD/CVA/PAD) +CAD, +CABG  [] Neuropathy- denies   [x] Retinopathy  [] Nephropathy-denies   [] HTN- denies   [x] Hyperlipidemia  [] Stroke/TIA- denies   [] Gastroparesis- denies     -Lifestyle: eat 3xa day w/ snacks. Walking daily about 30 mins, casual walk  -  Modifying factors: States jardiance was not available in [place], when he went to [facility] last Jan 2023 for 3 months.     Previously trialed/failed DM meds: Humalog, Levemir, Novolin R    History/Other:    Allergies, PMH, SocHx and FHx reviewed and updated as appropriate in Epic on    Continuous Glucose Sensor (FREESTYLE VERONIKA 3 SENSOR) Does not apply Misc 1 every 14 days 2 each 3    metoprolol tartrate 50 MG Oral Tab Take 1 tablet (50 mg total) by mouth 2x Daily(Beta Blocker). 60 tablet 0    metFORMIN 500 MG Oral Tab Take 2 tablets (1,000 mg total) by mouth 2 (two) times daily with meals. 360 tablet 0    atorvastatin 80 MG Oral Tab Take 1 tablet (80 mg total) by mouth nightly. 90 tablet 0    Insulin Pen Needle 32G X 4 MM Does not apply Misc Use daily with insulin pen 100 each 0    finasteride 5 MG Oral Tab Take 1 tablet (5 mg total) by mouth daily. 90 tablet 3    Empagliflozin (JARDIANCE) 25 MG Oral Tab Take 25 mg by mouth daily. 90 tablet 1    insulin degludec (TRESIBA FLEXTOUCH) 100 UNIT/ML Subcutaneous Solution Pen-injector Inject 12 Units into the skin daily. 15 mL 1    aspirin 81 MG Oral Tab EC Take 1 tablet (81 mg total) by mouth daily. 30 tablet 5    Ferrous Sulfate 325 (65 Fe) MG Oral Tab Take 1 tablet (325 mg total) by mouth daily with breakfast.  0    Glucose Blood (ONETOUCH VERIO) In Vitro Strip 4x daily 125 strip 5     No Known Allergies  Current Outpatient Medications   Medication Sig Dispense Refill    Continuous Glucose Sensor (FREESTYLE VERONIKA 3 SENSOR) Does not apply Misc 1 every 14 days 2 each 3    metoprolol tartrate 50 MG Oral Tab Take 1 tablet (50 mg total) by mouth 2x Daily(Beta Blocker). 60 tablet 0    metFORMIN 500 MG Oral Tab Take 2 tablets (1,000 mg total) by mouth 2 (two) times daily with meals. 360 tablet 0    atorvastatin 80 MG Oral Tab Take 1 tablet (80 mg total) by mouth nightly. 90 tablet 0    Insulin Pen Needle 32G X 4 MM Does not apply Misc Use daily with insulin pen 100 each 0     finasteride 5 MG Oral Tab Take 1 tablet (5 mg total) by mouth daily. 90 tablet 3    Empagliflozin (JARDIANCE) 25 MG Oral Tab Take 25 mg by mouth daily. 90 tablet 1    insulin degludec (TRESIBA FLEXTOUCH) 100 UNIT/ML Subcutaneous Solution Pen-injector Inject 12 Units into the skin daily. 15 mL 1    aspirin 81 MG Oral Tab EC Take 1 tablet (81 mg total) by mouth daily. 30 tablet 5    Ferrous Sulfate 325 (65 Fe) MG Oral Tab Take 1 tablet (325 mg total) by mouth daily with breakfast.  0    Glucose Blood (ONETOUCH VERIO) In Vitro Strip 4x daily 125 strip 5     Past Medical History:    Diabetes (HCC)    High cholesterol    Other and unspecified hyperlipidemia    Stress    Wears glasses     History reviewed. No pertinent family history.  Social history: Reviewed.      ROS/Exam    REVIEW OF SYSTEMS: Ten point review of systems has been performed and is otherwise negative and/or non-contributory, except as described above.     VITALS  Vitals:    09/11/24 1214   BP: 108/64   Pulse: 74   SpO2: 97%   Weight: 130 lb (59 kg)   Height: 5' 9.5\" (1.765 m)       Wt Readings from Last 6 Encounters:   09/11/24 130 lb (59 kg)   08/12/24 130 lb (59 kg)   05/30/24 137 lb 3.2 oz (62.2 kg)   01/20/23 133 lb (60.3 kg)   12/15/22 136 lb (61.7 kg)   10/19/22 137 lb 3.2 oz (62.2 kg)       PHYSICAL EXAM  CONSTITUTIONAL:  awake, alert, cooperative, no apparent distress, and appears stated age Vss stable   PSYCH: normal affect  LUNGS: breathing comfortably  CARDIOVASCULAR:  regular rate   NECK:  no palpable thyroid nodules     Labs/Imaging:   Lab Results   Component Value Date    CHOLEST 115 08/12/2024    CHOLEST 88 08/05/2022    TRIG 118 08/12/2024    TRIG 121 08/05/2022    HDL 28 (L) 08/12/2024    HDL 30 (L) 08/05/2022    LDL 65 08/12/2024    LDL 36 08/05/2022     No results found for: \"MICROALBCREA\"   Lab Results   Component Value Date    CREATSERUM 0.81 08/12/2024    CREATSERUM 0.84 10/31/2022    EGFRCR 106 08/12/2024    EGFRCR 106 10/31/2022      Lab Results   Component Value Date    AST 23 08/12/2024    AST 15 10/31/2022    ALT 18 08/12/2024    ALT 26 10/31/2022       Lab Results   Component Value Date    TSH 1.306 08/12/2024    TSH 1.100 06/22/2022       Overall glucose control:  Lab Results   Component Value Date    A1C 8.1 (H) 08/12/2024    A1C 8.5 (A) 05/30/2024    A1C 7.3 (A) 12/15/2022    A1C 7.6 (A) 10/19/2022    A1C 7.6 (A) 08/10/2022       Supplementary Documentation:   -Surveillance for Diabetes Complications & Risks  Foot exam/neuropathy: Last Foot Exam: 05/30/24    Retinopathy screening: No data recordedNo data recorded   In 2022, last eye exam , no DR at that time per pt. Done in [place], private practice.     Assessment & Plan:     ICD-10-CM    1. Type 2 diabetes mellitus with hyperglycemia, with long-term current use of insulin ([facility])  E11.65 Microalb/Creat Ratio, Random Urine    Z79.4 Continuous Glucose Sensor (FREESTYLE VERONIKA 3 SENSOR) Does not apply Misc     GLUCOSE MONITORING, 72 HRS, CONT REC & STORAGE, GLUCOSE      2. Nonproliferative diabetic retinopathy of both eyes ([facility])  E11.3293       3. Coronary artery disease involving native coronary artery of native heart without angina pectoris  I25.10       4. Vitamin D deficiency  E55.9 Vitamin D      5. Primary hypertension  I10       6. Hyperlipidemia associated with type 2 diabetes mellitus ([facility])  E11.69     E78.5       7. Adult BMI <19 kg/sq m  Z68.1           Shaq Boone is a pleasant 54 year old male here for evaluation of:    #Diabetes w/ retinopathy- PMHx of Type 2 diabetes mellitus diagnosed in 2022 while he had NSTEMI/CAD s/p CABG at that time, antibodies negative 2022 .  Last A1c value was 8.1% done 8/12/2024. Above goal. Reviewed veronika TIR 78%, GMI 7.1%, Lows 0, at goal. Jardiance was recently increased to 25 mg by cardiology , agree.   Goal <7%. Importance of better glucose control in preventing onset/progression of end-organ damage discussed, as well as goals of  therapy and clinical significance of A1C.     - med changes:  - If you see that your fasting glucose above 120  or 2 hrs after lunch above 180 after a week.  Increase your tresiba from 10 to 12 units. If glucose are stable no change, continue tresiba 10 units  - Continue Metformin 500 m tabs twice daily with meals and Jardiance 25 mg   - If you get glucose below 70 in ced, check it w/ fingerstick  - check your feet daily  - - will do A1c on your 6 weeks follow up   - get urine and blood test  - see ophthalmology once  a year: given list of optho  - continue Freestyle Ced 3 CGM with phone (connected to clinic profile)  - no strict contraindication for SGLT2i, metformin   -See above header \"Supplementary Documentation\" for surveillance for diabetes complications & risks  Discussed targeted glucose levels and symptoms and treatment of hypoglycemia w/ 15/15 rule for glucose 55-70 and 30/15 rule w/ glucose < 55. Also to f/u w/ protein or meal after glucose went up to 80.       #Nephropathy screening: ordered [facility]  Lab Results   Component Value Date    EGFRCR 106 2024      #Hx of CAD/CABG  - followed by cardiology, need a tighter control of LDL     #Vitamin D Deficiency  - checking labs    #HTN: SBP is to goal <130 , per PCP   BP Readings from Last 1 Encounters:   24 108/64   BP Meds: metoprolol tartrate Tabs - 50 MG     #Hyperlipidemia/Lipids: LDL is not to goal   Lab Results   Component Value Date    LDL 65 2024    TRIG 118 2024   Cholesterol Meds: atorvastatin Tabs - 80 MG , not missing the dose.    Discussed decrease saturated fat, trans fat, and cholesterol intake  example: Butter, lard, shortening, coconut, coconut oil, palm oil, fried food, bologna,pepperoni, salami, egg yolks, Poultry skin, visible meat fat.  - Increase non-starchy fiber intake: aspararagus, carrots, broccoli, [place] sprouts, baby corn, eggplant, cabbage, tomatoes, salad greens  - will repeat lipid/direct ldl in 3  months     #BMI 19  -  might consider DPP4 I on next visit,   normal BMI, continue balanced diet and exercise.    Return in about 9 weeks (around 11/13/2024).    Total time spent  45 minutes today on obtaining history, reviewing pertinent labs, evaluating patient, providing multiple treatment options, reinforcing diet/exercise and compliance, and completing documentation, of which greater than 50% was spent in face to face discussion with the patient  who demonstrated understanding and agreement with plan.     Thank you for allowing me to participate in the care of this patient.  Please feel free to contact me with any questions.    MUKUND Cameron, [facility]  Endocrinology, Diabetes & Metabolism   09/11/24    In reviewing this note, please be advised that Dragon Voice Recognition software used to dictate the note may have made errors in recognizing some of the words or phrases.     Note to patient: The 21 Century Cures Act makes medical notes like these available to patients in the interest of transparency. However, be advised this is a medical document. It is intended as peer to peer communication. It is written in medical language and may contain abbreviations or verbiage that are unfamiliar. It may appear blunt or direct. Medical documents are intended to carry relevant information, facts as evident, and the clinical opinion of the practitioner.

## 2024-09-11 NOTE — TELEPHONE ENCOUNTER
9/11/24-Received via fax from [facility] a PA for Chewse Ced 3 Plus Sensor.  Placed in PA in basket.

## 2024-09-11 NOTE — TELEPHONE ENCOUNTER
PA done in [facility] for Ced 3 Plus sensors.    KEY: [id number]    Unable to send to [facility] site is down.

## 2024-09-11 NOTE — PATIENT INSTRUCTIONS
Follow up plan:  With MUKUND Ruvalcaba: Return in about 6 weeks (around 10/23/2024).    [ x ] In person only  [ x ] After visit summary   [ x ] Placed labs/imaging. Labs are to be drawn non fasting      Discharge Instruction:  - will do A1c on your 6 weeks follow up   - If you see that your fasting glucose above 120  or 2 hrs after lunch above 180 after a week.  Increase your tresiba from 10 to 12 units. If glucose are stable no change, continue tresiba 10 units  - Continue Metformin 500 m tabs twice daily with meals,  and Jardiance 25 mg   - If you get glucose below 70 in [place], check it w/ fingerstick  - check your feet daily  - decrease saturated fat, trans fat, and cholesterol intake  example: Butter, lard, shortening, coconut, coconut oil, palm oil, fried food, bologna,pepperoni, salami, egg yolks, Poultry skin, visible meat fat.  - Increase non-starchy fiber intake: aspararagus, carrots, broccoli, [place] sprouts, baby corn, eggplant, cabbage, tomatoes, salad greens  - will repeat lipid/direct ldl in 3 months   - get urine and blood test  - see ophthalmology once  a year  Ophthalmologist for diabetes eye exam:  [facility]: Dr Marla Orona- [address] [address]- PHONE: [phone]  [facility]: [phone]  Dr. Oppenheim in [place]- [phone]  [facility]: Phone: [phone], Address: [address] [place] [address] [facility]- [phone], [facility]- [phone]- [facility], or [facility]  [facility]: [facility] - [phone]  [place]: Walter Swain- [employer]- they can do screenings using their machine  [facility]- [phone] - in [place]  Loan Shirley MD - [place]        Office phone number: [phone]; phones are open Monday-Friday 8:30-4:30.   Thank you for visiting our office. We look forward to working with you to reach your  health goals. As a reminder, if you need refills, please request early so there is enough time to process the request. We ask that you provide at least 5 days' notice before a refill is due, so there is time to send a request to pharmacy, process the refill, and ensure there are no other problems with obtaining the medication (backorders, prior authorization paperwork, etc). Routine refills will not be addressed on weekends, so please submit these requests during the week.    Blood sugar targets:  Before breakfast: , 2 hours after meals: <180, prior lunch or dinner < 140  Time in Range goal is higher than 80% if using a continuous glucose monitor (Dexcom or Ced).  Time in Range can be found within the Dexcom G7 caleb, on Clarity if using Dexcom G6, or on LibreView if using Ced.    HOW TO TREAT LOW BLOOD SUGAR (Hypoglycemia)  Low blood sugar= Less than 70, or if you start to have symptoms (below)  Symptoms: Shaking or trembling, fast heart rate, extreme hunger, sweating, confusion/difficulty concentrating, dizziness.    How to treat a low blood sugar if you are able to eat/drink: The Rule of 15/15  If you are using continuous glucose monitor that says you are low, but you do not have any symptoms, verify on fingerstick that your blood sugar is actually low before treating.   Eat 15 grams of carbs (see examples below)  Check your blood sugar after 15 minutes. If it’s still below your target range, have another serving.   Repeat these steps until it’s in your target range. Once it’s in range, if you're nervous about your sugar going low again, have a protein source (ie, a spoonful of peanut butter).   If you have a CGM you want to look for how your arrow has changed. If you arrow is pointed up or sideways after 15 min, give your CGM more time OR check with a finger stick. Try not to eat more food until at least 15 min after the first BG check - otherwise you risk having a rebound high.  If you are experiencing  symptoms and you are unable to check your blood glucose for any reason, treat the hypoglycemia.  If someone has a low blood sugar and is unconscious: Don’t hesitate to call 911. If someone is unconscious and glucagon is not available or someone does not know how to use it, call 911 immediately.    To treat a low, I recommend you carry with you easy, pre-portioned treatment for low blood sugars that are 15G of carbs:   - Children sized squeeze pouch applesauce (high fiber + carbs help prevent too high of a spike)  - Small children's sized juicebox- 15g carb --> 4oz juice box  - Glucose tablets from [employer], you can find them near diabetes supplies --> Note, you will need to eat 3-4 tablets to get to 15g of carbs  - Children sized fruit snack pack- look for one with 15 grams of total carbohydrate  - Choice of how to treat your low is important. Complex carbs, or foods that contain fats along with carbs (like chocolate) can slow the absorption of glucose and should NOT be used to treat an emergency low

## 2024-09-12 RX ORDER — BLOOD-GLUCOSE SENSOR
1 EACH MISCELLANEOUS
Qty: 2 EACH | Refills: 3 | Status: SHIPPED | OUTPATIENT
Start: 2024-09-12

## 2024-09-12 NOTE — TELEPHONE ENCOUNTER
Endocrine Refill protocol for Glucose testing supplies     Protocol Criteria: PASSED Reason: N/A  Appointment with Endocrinology completed in the last 12 months or scheduled in the next 6 months     Verify appointment has been completed or scheduled in the appropriate timeline. If so can send a 90 day supply with 1 refill.     Last completed office visit: 9/11/2024 Luna Lemons APRN   Next scheduled Follow up:   Future Appointments   Date Time Provider Department Center   11/13/2024 12:15 PM Luna Lemons APRN [facility]       Ordered Ced 3 plus sensors per protocol.

## 2024-09-13 NOTE — TELEPHONE ENCOUNTER
RN logged into CMM- issues seemed to be resolved.    PA sent to plan, awaiting additional questions to come in. If you need assistance, please chat with CoverMyMeds or call us at [phone].

## 2024-09-16 NOTE — TELEPHONE ENCOUNTER
PA sent to plan in CMM    Will await determination- [employer] is reviewing your PA request and will respond within 24 hours for Medicaid or up to 72 hours for non-Medicaid plans, based on the required timeframe determined by state or federal regulations. To check for an update later, open this request from your dashboard.

## 2024-10-02 DIAGNOSIS — I25.10 CORONARY ARTERY DISEASE INVOLVING NATIVE CORONARY ARTERY OF NATIVE HEART WITHOUT ANGINA PECTORIS: ICD-10-CM

## 2024-10-02 NOTE — TELEPHONE ENCOUNTER
10/2/24-Received via fax from [facility] a 90 day prescription request for Metoprolol tartrate 50mg tab.  Placed in PA in basket.

## 2024-10-03 RX ORDER — METOPROLOL TARTRATE 50 MG
50 TABLET ORAL
Qty: 60 TABLET | Refills: 3 | Status: SHIPPED | OUTPATIENT
Start: 2024-10-03 | End: 2024-10-03

## 2024-10-03 RX ORDER — METOPROLOL TARTRATE 50 MG
50 TABLET ORAL
Qty: 60 TABLET | Refills: 3 | Status: SHIPPED | OUTPATIENT
Start: 2024-10-03

## 2024-11-13 ENCOUNTER — OFFICE VISIT (OUTPATIENT)
Facility: CLINIC | Age: 54
End: 2024-11-13
Payer: COMMERCIAL

## 2024-11-13 VITALS
BODY MASS INDEX: 19.11 KG/M2 | WEIGHT: 132 LBS | DIASTOLIC BLOOD PRESSURE: 66 MMHG | SYSTOLIC BLOOD PRESSURE: 108 MMHG | HEART RATE: 71 BPM | HEIGHT: 69.5 IN | OXYGEN SATURATION: 98 %

## 2024-11-13 DIAGNOSIS — I25.10 CORONARY ARTERY DISEASE INVOLVING NATIVE CORONARY ARTERY OF NATIVE HEART WITHOUT ANGINA PECTORIS: ICD-10-CM

## 2024-11-13 DIAGNOSIS — Z79.4 TYPE 2 DIABETES MELLITUS WITH HYPERGLYCEMIA, WITH LONG-TERM CURRENT USE OF INSULIN (HCC): Primary | ICD-10-CM

## 2024-11-13 DIAGNOSIS — E11.65 TYPE 2 DIABETES MELLITUS WITH HYPERGLYCEMIA, WITH LONG-TERM CURRENT USE OF INSULIN (HCC): ICD-10-CM

## 2024-11-13 DIAGNOSIS — E11.69 HYPERLIPIDEMIA ASSOCIATED WITH TYPE 2 DIABETES MELLITUS (HCC): ICD-10-CM

## 2024-11-13 DIAGNOSIS — I10 PRIMARY HYPERTENSION: ICD-10-CM

## 2024-11-13 DIAGNOSIS — E11.65 TYPE 2 DIABETES MELLITUS WITH HYPERGLYCEMIA, WITH LONG-TERM CURRENT USE OF INSULIN (HCC): Primary | ICD-10-CM

## 2024-11-13 DIAGNOSIS — E78.5 HYPERLIPIDEMIA ASSOCIATED WITH TYPE 2 DIABETES MELLITUS (HCC): ICD-10-CM

## 2024-11-13 DIAGNOSIS — E11.8 DIABETES MELLITUS WITH COMPLICATION (HCC): ICD-10-CM

## 2024-11-13 DIAGNOSIS — E55.9 VITAMIN D DEFICIENCY: ICD-10-CM

## 2024-11-13 DIAGNOSIS — Z79.4 TYPE 2 DIABETES MELLITUS WITH HYPERGLYCEMIA, WITH LONG-TERM CURRENT USE OF INSULIN (HCC): ICD-10-CM

## 2024-11-13 LAB — HEMOGLOBIN A1C: 7.6 % (ref 4.3–5.6)

## 2024-11-13 PROCEDURE — 83036 HEMOGLOBIN GLYCOSYLATED A1C: CPT | Performed by: NURSE PRACTITIONER

## 2024-11-13 PROCEDURE — 99215 OFFICE O/P EST HI 40 MIN: CPT | Performed by: NURSE PRACTITIONER

## 2024-11-13 RX ORDER — INSULIN DEGLUDEC 100 U/ML
INJECTION, SOLUTION SUBCUTANEOUS
Qty: 30 ML | Refills: 1 | Status: SHIPPED | OUTPATIENT
Start: 2024-11-13

## 2024-11-13 RX ORDER — HYDROCHLOROTHIAZIDE 12.5 MG/1
1 CAPSULE ORAL
Qty: 2 EACH | Refills: 3 | Status: SHIPPED | OUTPATIENT
Start: 2024-11-13

## 2024-11-13 RX ORDER — ASPIRIN 81 MG/1
81 TABLET ORAL DAILY
Qty: 30 TABLET | Refills: 5 | Status: SHIPPED | OUTPATIENT
Start: 2024-11-13

## 2024-11-13 RX ORDER — HYDROCHLOROTHIAZIDE 12.5 MG/1
1 CAPSULE ORAL
Qty: 2 EACH | Refills: 3 | Status: CANCELLED | OUTPATIENT
Start: 2024-11-13

## 2024-11-13 RX ORDER — INSULIN DEGLUDEC 100 U/ML
INJECTION, SOLUTION SUBCUTANEOUS
Qty: 30 ML | Refills: 1 | Status: SHIPPED | OUTPATIENT
Start: 2024-11-13 | End: 2024-11-13

## 2024-11-13 NOTE — PROGRESS NOTES
[facility] Note    Name: Shaq Boone    Date: 24    Shaq Boone is a 54 year old male who presents for evaluation of T2DM management.     Chief complaint: Follow - Up (PT here for routine DM2 f/u, per pt no current concerns or sx./Per pt checks BG via CGM Ced 3/Last A1c value was 8.1% done 2024. /Last Diabetic Eye Exam-22/Last Diabetic Foot Exam-24)       Subjective:   DM hx:  -Diagnosed with diabetes in  while he had NSTEMI/CAD s/p CABG at that time, antibodies negative    C peptide 1.46- 22  -Family history- none known      Initial HPI consult - 24: Last office visit for DM mgmt of the pt:  7/10/24  Mgmt by: Amber Guidry NP from [facility]  DM meds at first office visit:Metformin 500 m tabs twice daily with meals, Jardiance 25 mg daily ( 10 days ago increased by cardiology), Tresiba 10 units qam    episodes of hypoglycemia: No    Continuous Glucose Monitoring Interpretation  Shaq Boone has undergone continuous glucose monitoring with the Proactastyle Ced 3 CGM with phone (connected to clinic profile).  The blood glucose tracings were evaluated for two weeks prior to office visit. Blood glucose tracings demonstrated no significant hyperglycemia. There were no significant hypoglycemia notedduring the weeks of evaluation.    Date: -24: TIR 78%, GMI 7.1% ,  low 0% , very low 0%, SD 25.5mg/dl, Sensor usage: 97%      -Re: potential DM medication contraindication (if positive, checkbox selected):  [] History of pancreatitis- denies   [] Personal/fam hx of medullary thyroid cancer/MEN2- denies   [] History of recent/frequent UTI/yeast infxn- denies   [] Previous amputation related to diabetes-denies     -Presence of associated DM complications (if positive, checkbox selected):  [x] Macrovascular complications (CAD/CVA/PAD) +CAD, +CABG  [] Neuropathy- denies   [x] Retinopathy  [] Nephropathy-denies   [] HTN- denies    [x] Hyperlipidemia  [] Stroke/TIA- denies   [] Gastroparesis- denies     -Lifestyle: eat 3xa day w/ snacks. Walking daily about 30 mins, casual walk  - Modifying factors: States jardiance was not available in [place], when he went to [facility] last 2023 for 3 months.     Previously trialed/failed DM meds: Humalog, Levemir, Novolin R    Interim Hx with Luna APN 24:   Current treatment: Tresiba 10 units Metformin 500 m tabs twice daily with meals and Jardiance 25 mg daily  Testing: Continuous Glucose Monitoring Interpretation  Shaq Boone  has undergone continuous glucose monitoring with the Freestyle Ced 3 CGM with phone (connected to clinic profile).  The blood glucose tracings were evaluated for two weeks prior to office visit. Blood glucose tracings demonstrated areas of hyperglycemia after lunch  . There were occasional hypoglycemia, particularly after 2am during the weeks of evaluation.    Date: 10/31-24: TIR 68%, GMI 7.3% ,  low 0% , very low 0%, SD 27.6mg/dl, Sensor usage: 97%      Hypoglycemia: as above  New complication related to DM:      History/Other:    Allergies, PMH, SocHx and FHx reviewed and updated as appropriate in Epic on    Continuous Glucose Sensor (FREESTYLE CED 3 PLUS SENSOR) Does not apply Misc 1 each every 14 (fourteen) days. Use as directed. One sensor every 15 days. 2 each 3    empagliflozin (JARDIANCE) 25 MG Oral Tab Take 1 tablet (25 mg total) by mouth daily. 90 tablet 1    metFORMIN 500 MG Oral Tab Take 2 tablets (1,000 mg total) by mouth 2 (two) times daily with meals. 360 tablet 0    insulin degludec (TRESIBA FLEXTOUCH) 100 UNIT/ML Subcutaneous Solution Pen-injector Inject 12 units every morning (For insurance purposes only: TDD max: 15 units per day) 30 mL 1    metoprolol tartrate 50 MG Oral Tab Take 1 tablet (50 mg total) by mouth 2x Daily(Beta Blocker). 60 tablet 3    Continuous Glucose Sensor (FREESTYLE CED 3 SENSOR) Does not apply Misc 1 every 14 days  2 each 3    atorvastatin 80 MG Oral Tab Take 1 tablet (80 mg total) by mouth nightly. 90 tablet 0    Insulin Pen Needle 32G X 4 MM Does not apply Misc Use daily with insulin pen 100 each 0    finasteride 5 MG Oral Tab Take 1 tablet (5 mg total) by mouth daily. 90 tablet 3    [DISCONTINUED] aspirin 81 MG Oral Tab EC Take 1 tablet (81 mg total) by mouth daily. 30 tablet 5    Ferrous Sulfate 325 (65 Fe) MG Oral Tab Take 1 tablet (325 mg total) by mouth daily with breakfast.  0    Glucose Blood (ONETOUCH VERIO) In Vitro Strip 4x daily 125 strip 5     No Known Allergies  Current Outpatient Medications   Medication Sig Dispense Refill    Continuous Glucose Sensor (FREESTYLE VERONIKA 3 PLUS SENSOR) Does not apply Misc 1 each every 14 (fourteen) days. Use as directed. One sensor every 15 days. 2 each 3    empagliflozin (JARDIANCE) 25 MG Oral Tab Take 1 tablet (25 mg total) by mouth daily. 90 tablet 1    metFORMIN 500 MG Oral Tab Take 2 tablets (1,000 mg total) by mouth 2 (two) times daily with meals. 360 tablet 0    insulin degludec (TRESIBA FLEXTOUCH) 100 UNIT/ML Subcutaneous Solution Pen-injector Inject 12 units every morning (For insurance purposes only: TDD max: 15 units per day) 30 mL 1    metoprolol tartrate 50 MG Oral Tab Take 1 tablet (50 mg total) by mouth 2x Daily(Beta Blocker). 60 tablet 3    Continuous Glucose Sensor (FREESTYLE VERONIKA 3 SENSOR) Does not apply Misc 1 every 14 days 2 each 3    atorvastatin 80 MG Oral Tab Take 1 tablet (80 mg total) by mouth nightly. 90 tablet 0    Insulin Pen Needle 32G X 4 MM Does not apply Misc Use daily with insulin pen 100 each 0    finasteride 5 MG Oral Tab Take 1 tablet (5 mg total) by mouth daily. 90 tablet 3    Ferrous Sulfate 325 (65 Fe) MG Oral Tab Take 1 tablet (325 mg total) by mouth daily with breakfast.  0    Glucose Blood (ONETOUCH VERIO) In Vitro Strip 4x daily 125 strip 5    Continuous Glucose Sensor (DEXCOM G7 SENSOR) Does not apply Misc 1 each Every 10 days. Use as  directed every 10 days 3 each 11    aspirin 81 MG Oral Tab EC Take 1 tablet (81 mg total) by mouth daily. 30 tablet 5     Past Medical History:    Diabetes (HCC)    High cholesterol    Other and unspecified hyperlipidemia    Stress    Wears glasses     History reviewed. No pertinent family history.  Social history: Reviewed.      ROS/Exam    REVIEW OF SYSTEMS: Ten point review of systems has been performed and is otherwise negative and/or non-contributory, except as described above.     VITALS  Vitals:    11/13/24 1207   BP: 108/66   Pulse: 71   SpO2: 98%   Weight: 132 lb (59.9 kg)   Height: 5' 9.5\" (1.765 m)         Wt Readings from Last 6 Encounters:   11/13/24 132 lb (59.9 kg)   09/11/24 130 lb (59 kg)   08/12/24 130 lb (59 kg)   05/30/24 137 lb 3.2 oz (62.2 kg)   01/20/23 133 lb (60.3 kg)   12/15/22 136 lb (61.7 kg)       PHYSICAL EXAM  CONSTITUTIONAL:  awake, alert, cooperative, no apparent distress, and appears stated age Vss stable   PSYCH: normal affect  LUNGS: breathing comfortably  CARDIOVASCULAR:  regular rate   NECK:  no palpable thyroid nodules     Labs/Imaging:   Lab Results   Component Value Date    CHOLEST 115 08/12/2024    CHOLEST 88 08/05/2022    TRIG 118 08/12/2024    TRIG 121 08/05/2022    HDL 28 (L) 08/12/2024    HDL 30 (L) 08/05/2022    LDL 65 08/12/2024    LDL 36 08/05/2022     No results found for: \"MICROALBCREA\"   Lab Results   Component Value Date    CREATSERUM 0.81 08/12/2024    CREATSERUM 0.84 10/31/2022    EGFRCR 106 08/12/2024    EGFRCR 106 10/31/2022     Lab Results   Component Value Date    AST 23 08/12/2024    AST 15 10/31/2022    ALT 18 08/12/2024    ALT 26 10/31/2022       Lab Results   Component Value Date    TSH 1.306 08/12/2024    TSH 1.100 06/22/2022       Overall glucose control:  Lab Results   Component Value Date    A1C 7.6 (A) 11/13/2024    A1C 8.1 (H) 08/12/2024    A1C 8.5 (A) 05/30/2024    A1C 7.3 (A) 12/15/2022    A1C 7.6 (A) 10/19/2022       Supplementary Documentation:    -Surveillance for Diabetes Complications & Risks  Foot exam/neuropathy: Last Foot Exam: 05/30/24    Retinopathy screening: No data recordedNo data recorded   In 2022, last eye exam , no DR at that time per pt. Done in [facility] practice. Patient has upcoming appt on Nov 20, 2024    Assessment & Plan:     ICD-10-CM    1. Type 2 diabetes mellitus with hyperglycemia, with long-term current use of insulin (HCC)  E11.65 Continuous Glucose Sensor (FREESTYLE VERONIKA 3 PLUS SENSOR) Does not apply Misc    Z79.4 metFORMIN 500 MG Oral Tab     POC Hemoglobin A1C     Islet Cell Cytoplasmic Antibody, IgG     Direct LDL     insulin degludec (TRESIBA FLEXTOUCH) 100 UNIT/ML Subcutaneous Solution Pen-injector     OP REFERRAL TO NUTRITIONIST/DIETICIAN     DISCONTINUED: insulin degludec (TRESIBA FLEXTOUCH) 100 UNIT/ML Subcutaneous Solution Pen-injector      2. Diabetes mellitus with complication (HCC)  E11.8 empagliflozin (JARDIANCE) 25 MG Oral Tab      3. Coronary artery disease involving native coronary artery of native heart without angina pectoris  I25.10       4. Primary hypertension  I10       5. Hyperlipidemia associated with type 2 diabetes mellitus (HCC)  E11.69     E78.5       6. Vitamin D deficiency  E55.9       7. Adult BMI <19 kg/sq m  Z68.1             Shaq Boone is a pleasant 54 year old male here for evaluation of:    #Diabetes w/ retinopathy- PMHx of Type 2 diabetes mellitus diagnosed in 2022 while he had NSTEMI/CAD s/p CABG at that time.   - AutoAB for type 1 DM: IA2 (-), Islet cell( no result), SUGEY 65(-), ZnT8 (- ); C peptide:detected: 8/17/22 ; - ordered islet cell blood test   - Last A1c value was 7.6% done 11/13/2024. Above goal, but trending down.   - Reviewed veronika: Date: 10/31-11/13/24: TIR 68%, GMI 7.3% ,  low 0% , very low 0%, SD 27.6mg/dl, Sensor usage: 97%.   - Goal <7%. Importance of better glucose control in preventing onset/progression of end-organ damage discussed, as well as goals of  therapy and clinical significance of A1C.  - med changes:  Insulin: Increase Tresiba from 10 to 12 units every morning( do not forget to take it)   Continue Metformin 500 m tabs twice daily with meals and Jardiance 25 mg  -- Follow up with [facility] in 2 weeks /  - check with fingerstick machine when ced says <70 to clarify if indeed hypoglycemia.    - continue Freestyle Ced 3+ CGM with phone (connected to clinic profile)  - patient is on insulin, and has suboptimal glycemic control including wide glycemic swings, thus would benefit from CGM  - meet with [facility] DM educator re:BG check in 6 weeks.   - previously intolerant: Humalog  - no strict contraindication for SGLT2i, metformin   -See above header \"Supplementary Documentation\" for surveillance for diabetes complications & risks  - Discussed targeted glucose levels and symptoms and treatment of hypoglycemia w/ 15/15 rule for glucose 55-70 and 30/15 rule w/ glucose < 55. Also to f/u w/ protein or meal after glucose went up to 80.   - referred patient to dietician for further discussion of getting balanced diet with uncontrolled DM and  hx of CABG.     #Nephropathy screening: ordered [facility]  Lab Results   Component Value Date    EGFRCR 106 2024        #HTN: SBP is to goal <130 , per PCP   BP Readings from Last 1 Encounters:   24 108/66   BP Meds: metoprolol tartrate Tabs - 50 MG     #Hyperlipidemia/Lipids: LDL is not to goal   Lab Results   Component Value Date    LDL 65 2024    TRIG 118 2024   Cholesterol Meds: atorvastatin Tabs - 80 MG , not missing the dose, given by cardiologist.   - Discussed decrease saturated fat, trans fat, and cholesterol intake  example: Butter, lard, shortening, coconut, coconut oil, palm oil, fried food, bologna,pepperoni, salami, egg yolks, Poultry skin, visible meat fat.  - checking direct ldl , might consider adding zetia if LDL still above 55.     #Hx of CAD/CABG  - followed by  cardiology, need a tighter control of LDL     #Vitamin D Deficiency  - checking labs    #BMI 19  - normal BMI, continue balanced diet and exercise.    Return in about 3 months (around 2/13/2025) for diabetes follow up.    Total time spent  45 minutes today on obtaining history, reviewing pertinent labs, evaluating patient, providing multiple treatment options, reinforcing diet/exercise and compliance, and completing documentation, of which greater than 50% was spent in face to face discussion with the patient  who demonstrated understanding and agreement with plan.     Thank you for allowing me to participate in the care of this patient.  Please feel free to contact me with any questions.    MUKUND Cameron, [facility]-BC  Endocrinology, Diabetes & Metabolism   11/13/24    In reviewing this note, please be advised that Dragon Voice Recognition software used to dictate the note may have made errors in recognizing some of the words or phrases.     Note to patient: The 21 Century Cures Act makes medical notes like these available to patients in the interest of transparency. However, be advised this is a medical document. It is intended as peer to peer communication. It is written in medical language and may contain abbreviations or verbiage that are unfamiliar. It may appear blunt or direct. Medical documents are intended to carry relevant information, facts as evident, and the clinical opinion of the practitioner.

## 2024-11-13 NOTE — TELEPHONE ENCOUNTER
Last time medication was refilled 01/09/2023  Last office visit  08/12/2024  Next office visit due/scheduled No future appointments.    Medication not on protocol, routed to Doctor Nguyen.

## 2024-11-13 NOTE — PATIENT INSTRUCTIONS
Return Visit:  APN: Return in about 3 months (around 2025) for diabetes follow up.  [] Video visit  [x] In person only    [x]  Diabetes Education: in 6 weeks     Diet/Lifestyle:   [] Carb counting 101   [] Carb Aware (T2DM nutrition counseling)   [] Carb Aware (T2DM nutrition counseling) + BG check in   [] Insulin dose fine tuning (If not yet carb counting, start with carb count 101)  [] DM burnout counseling    Medication/devices:   [] Pump settings check in after changes made today   [] Starting insulin pump ( 90 mins)   [] Pump 101 ( learning about pumps and carb counting)   [x] BG check in   [] Ordered CGM today- Patient will need to call the office to schedule CGM training once they receive their device. Call Office phone number: [phone] to schedule    Also schedule with EVELINE Burleson      [x]  Directions to 1st floor lab    []  Give blood sugar log  []  PAP paperwork for Ozempic/Jardiance (english/Yoruba)     Summary of today's visit:  Medication changes:    Insulin: Increase Tresiba from 10 to 12 units every morning( do not forget to take it)   Continue Metformin 500 m tabs twice daily with meals and Jardiance 25 mg   - Follow up with [facility] in 2 weeks /  - check with fingerstick machine when abel says <70 to clarify if indeed hypoglycemia.   - Targeted fasting glucose (at least 8-10 hrs of no food/sweetened beverage intake)< 130 and 2 hrs after lunch or dinner< 180  and prior lunch or dinner <140  -  Follow 15g/15 min rule if you develop any hypoglycemia symptoms w/ glucose <70  but if glucose <55 follow 30g/15 min rule. Once glucose above 80, eat a protein or food w protein.   - Balanced diet: carbohydrates, protein, healthy fats and fiber in each meal. Exercise of at least 150 mins each week. Decrease your simple carbohydrates intake such as sweets: cookies, soda, candy, white rice, white pasta, syrups    Additional comments:   - If labs were ordered today, please complete  prior to your follow up visit- FASTING  - Please let us know if you require any refills at least 1 week prior to your medication running out   - Please call our office if sugars at home are consistently greater than 250 or less than 70     HOW TO TREAT LOW BLOOD SUGAR (Hypoglycemia)  Low blood sugar= Less than 70, or if you start to have symptoms (below)  Symptoms: Shaking or trembling, fast heart rate, extreme hunger, sweating, confusion/difficulty concentrating, dizziness.    How to treat a low blood sugar if you are able to eat/drink: The Rule of 15/15  If you are using continuous glucose monitor that says you are low, but you do not have any symptoms, verify on fingerstick that your blood sugar is actually low before treating.   Eat 15 grams of carbs (see examples below)  Check your blood sugar after 15 minutes. If it’s still below your target range, have another serving.   Repeat these steps until it’s in your target range. Once it’s in range, if you're nervous about your sugar going low again, have a protein source (ie, a spoonful of peanut butter).   If you have a CGM you want to look for how your arrow has changed. If you arrow is pointed up or sideways after 15 min, give your CGM more time OR check with a finger stick. Try not to eat more food until at least 15 min after the first BG check - otherwise you risk having a rebound high.  If you are experiencing symptoms and you are unable to check your blood glucose for any reason, treat the hypoglycemia.  If someone has a low blood sugar and is unconscious: Don’t hesitate to call 911. If someone is unconscious and glucagon is not available or someone does not know how to use it, call 911 immediately.     To treat a low, I recommend you carry with you easy, pre-portioned treatment for low blood sugars that are 15G of carbs:   - Children sized squeeze pouch applesauce (high fiber + carbs help prevent too high of a spike)  - Small children's sized juicebox- 15g  carb --> 4oz juice box  - Glucose tablets from [employer], you can find them near diabetes supplies --> Note, you will need to eat 3-4 tablets to get to 15g of carbs  - Children sized fruit snack pack- look for one with 15 grams of total carbohydrate  - Choice of how to treat your low is important. Complex carbs, or foods that contain fats along with carbs (like chocolate) can slow the absorption of glucose and should NOT be used to treat an emergency low

## 2024-11-14 RX ORDER — ACYCLOVIR 400 MG/1
1 TABLET ORAL
Qty: 3 EACH | Refills: 11 | Status: SHIPPED | OUTPATIENT
Start: 2024-11-14

## 2024-11-14 NOTE — TELEPHONE ENCOUNTER
Placed call to pharmacy to verify cost of abel 3+ versus abel 3.  Pharmacist confirms that the copay for the abel 3+ is $140 for 1 month supply.  Will attempt dexcom to see if cost is better.

## 2024-12-16 DIAGNOSIS — E11.65 TYPE 2 DIABETES MELLITUS WITH HYPERGLYCEMIA, WITH LONG-TERM CURRENT USE OF INSULIN (HCC): ICD-10-CM

## 2024-12-16 DIAGNOSIS — Z79.4 TYPE 2 DIABETES MELLITUS WITH HYPERGLYCEMIA, WITH LONG-TERM CURRENT USE OF INSULIN (HCC): ICD-10-CM

## 2024-12-17 RX ORDER — ACYCLOVIR 800 MG/1
TABLET ORAL
Qty: 2 EACH | Refills: 3 | Status: SHIPPED | OUTPATIENT
Start: 2024-12-17

## 2024-12-17 NOTE — TELEPHONE ENCOUNTER
Endocrine Refill protocol for CGM supplies     Protocol Criteria:  PASSED Reason: N/A    If below requirement is met, send a 90-day supply with 1 refill per provider protocol.     Verify appointment with Endocrinology completed in the last 12 months or scheduled in the next 6 months     Last completed office visit:11/13/2024 Luna Lemons APRN   Next scheduled Follow up:   Future Appointments   Date Time Provider Department Center   1/15/2025 10:15 AM EMG DIABETIC EDUCATOR [facility]O [facility] [place]   2/12/2025 11:00 AM Luna Lemons APRN [facility]

## 2024-12-27 DIAGNOSIS — E78.2 MIXED HYPERLIPIDEMIA: ICD-10-CM

## 2024-12-27 RX ORDER — ATORVASTATIN CALCIUM 80 MG/1
80 TABLET, FILM COATED ORAL NIGHTLY
Qty: 90 TABLET | Refills: 0 | Status: SHIPPED | OUTPATIENT
Start: 2024-12-27

## 2024-12-27 NOTE — TELEPHONE ENCOUNTER
Pls remind patient that he needs to get a repeat fasting blood test ( direct LDL placed last 11/13/24) with hx of CAD,need a tighter control of LDL, thus might need to add another cholesterol medicine. Last LDL was 65 -8/12/24; recent LFTs normal   I approved the atorvastatin 80 mg for now.

## 2024-12-27 NOTE — TELEPHONE ENCOUNTER
Requested Prescriptions     Pending Prescriptions Disp Refills    atorvastatin 80 MG Oral Tab 90 tablet 0     Sig: Take 1 tablet (80 mg total) by mouth nightly.     Your appointments       Date & Time Appointment Department ([place])    Daryn 15, 2025 10:15 AM CST Nurse Visit with EMG DIABETIC EDUCATOR [facility] ([facility])        Feb 12, 2025 11:00 AM CST Follow Up Visit with Luna Lemons APRN [facility] ([facility])              [facility]  [address] [facility], [address] [address]  [phone]          Last A1c value was 7.6% done 11/13/2024.    Refill 8/28/24  LOV 11/13/24 with Luna Lemons

## 2025-01-15 ENCOUNTER — NURSE ONLY (OUTPATIENT)
Facility: CLINIC | Age: 55
End: 2025-01-15
Payer: COMMERCIAL

## 2025-01-15 DIAGNOSIS — Z79.4 TYPE 2 DIABETES MELLITUS WITH HYPERGLYCEMIA, WITH LONG-TERM CURRENT USE OF INSULIN (HCC): Primary | ICD-10-CM

## 2025-01-15 DIAGNOSIS — E11.65 TYPE 2 DIABETES MELLITUS WITH HYPERGLYCEMIA, WITH LONG-TERM CURRENT USE OF INSULIN (HCC): Primary | ICD-10-CM

## 2025-01-15 PROCEDURE — 99211 OFF/OP EST MAY X REQ PHY/QHP: CPT | Performed by: NURSE PRACTITIONER

## 2025-01-15 NOTE — PROGRESS NOTES
Continuous Glucose Monitor Download - Ced    Shaq Boone  1970    Referred by: GUCCI Ruvalcaba     [facility]    Medical Background      Past Medical History:    Diabetes (HCC)    High cholesterol    Other and unspecified hyperlipidemia    Stress    Wears glasses      Lab Results   Component Value Date    A1C 7.6 (A) 2024    A1C 8.1 (H) 2024    A1C 8.5 (A) 2024    A1C 7.3 (A) 12/15/2022    A1C 7.6 (A) 10/19/2022           Patient has T2DM, seen today regarding blood glucose check in     Medication Review      Diabetic Meds       Insulin Disp Start End     insulin degludec (TRESIBA FLEXTOUCH) 100 UNIT/ML Subcutaneous Solution Pen-injector 30 mL 2024 --    Sig: Inject 12 units every morning (For insurance purposes only: TDD max: 15 units per day)       Biguanides Disp Start End     metFORMIN 500 MG Oral Tab 360 tablet 2024 --    Sig - Route: Take 2 tablets (1,000 mg total) by mouth 2 (two) times daily with meals. - Oral       Diabetic Supplies Disp Start End     Continuous Glucose Sensor (FREESTYLE CED 3 PLUS SENSOR) Does not apply Misc 2 each 2024 --    Sig - Route: 1 each every 14 (fourteen) days. Use as directed. One sensor every 15 days. - Does not apply     Continuous Glucose Sensor (FREESTYLE CED 3 SENSOR) Does not apply Misc 2 each 2024 --    Si every 14 days     Continuous Glucose Sensor (DEXCOM G7 SENSOR) Does not apply Misc 3 each 2024 --    Sig - Route: 1 each Every 10 days. Use as directed every 10 days - Does not apply       Sodium-Glucose Co-Transporter 2 (SGLT2) Inhibitors Disp Start End     empagliflozin (JARDIANCE) 25 MG Oral Tab 90 tablet 2024 --    Sig - Route: Take 1 tablet (25 mg total) by mouth daily. - Oral             Last office visit notes:  Insulin: Increase Tresiba from 10 to 12 units every morning( do not forget to take it)   Continue Metformin 500 m tabs twice daily with meals and Jardiance  25 mg    Patient Comments:  - None     Blood Glucose Review          Interpretation of Data:  TIR to goal, blood glucose mostly elevated after dinner and overnight     Patient Concerns      - Needs refills for the abel 3, abel 3+ too expensive   - Has reduced walking times recently, had been walking 10 miles daily, now that it's cold only getting 1-2 miles daily   --> advised that yes, reduced activity can effect blood glucose levels and cause them to run higher.     Recommendations / Plan      - Increase tresiba to 14 u daily    --> consider splitting the dose to get overnight coverage as well, 7 u AM, 7 u PM     Future Appointments   Date Time Provider Department Center   1/15/2025 10:15 AM EMG DIABETIC EDUCATOR [facility]O [facility] [place]   2/12/2025 11:00 AM Luna Lemons APRN [facility] [place]        Routed to provider for review.    1/15/2025  Myra Talbot RN, MSN, BC-ADM, [facility]  Diabetes Care &      A total of 10 minutes was spent with the patient including chart review, discussion and education / advisement pertinent to patient and provider specified concerns as documented above.     Note to patient: The 21 Century Cures Act makes medical notes like these available to patients in the interest of transparency. However, be advised this is a medical document. It is intended as peer to peer communication. It is written in medical language and may contain abbreviations or verbiage that are unfamiliar. It may appear blunt or direct. Medical documents are intended to carry relevant information, facts as evident, and the clinical opinion of the practitioner.

## 2025-01-16 NOTE — PROGRESS NOTES
Appreciate CDCES-RN recommendations, reviewed and agree.  Agree to split Tresiba 7 units every 12 hrs. ( Increased from 12 to 14 units per day)   Continue Metformin 500 m tabs twice daily with meals and Jardiance 25 mg.   Luna Lemons, APRN  2025

## 2025-01-20 ENCOUNTER — PATIENT MESSAGE (OUTPATIENT)
Facility: CLINIC | Age: 55
End: 2025-01-20

## 2025-01-20 DIAGNOSIS — E11.65 TYPE 2 DIABETES MELLITUS WITH HYPERGLYCEMIA, WITH LONG-TERM CURRENT USE OF INSULIN (HCC): ICD-10-CM

## 2025-01-20 DIAGNOSIS — Z79.4 TYPE 2 DIABETES MELLITUS WITH HYPERGLYCEMIA, WITH LONG-TERM CURRENT USE OF INSULIN (HCC): ICD-10-CM

## 2025-01-20 RX ORDER — ACYCLOVIR 800 MG/1
TABLET ORAL
Qty: 2 EACH | Refills: 3 | Status: SHIPPED
Start: 2025-01-20

## 2025-01-20 RX ORDER — ACYCLOVIR 800 MG/1
TABLET ORAL
Qty: 2 EACH | Refills: 3 | Status: SHIPPED | OUTPATIENT
Start: 2025-01-20 | End: 2025-01-20

## 2025-01-20 NOTE — TELEPHONE ENCOUNTER
Endocrine Refill protocol for CGM supplies     Protocol Criteria:  PASSED Reason: N/A    If below requirement is met, send a 90-day supply with 1 refill per provider protocol.     Verify appointment with Endocrinology completed in the last 12 months or scheduled in the next 6 months     Last completed office visit:11/13/2024 Luna Lemons APRN   Next scheduled Follow up:   Future Appointments   Date Time Provider Department Center   2/12/2025 11:00 AM Luna Lemons APRN [facility]

## 2025-02-10 DIAGNOSIS — Z79.4 TYPE 2 DIABETES MELLITUS WITH HYPERGLYCEMIA, WITH LONG-TERM CURRENT USE OF INSULIN (HCC): ICD-10-CM

## 2025-02-10 DIAGNOSIS — E11.65 TYPE 2 DIABETES MELLITUS WITH HYPERGLYCEMIA, WITH LONG-TERM CURRENT USE OF INSULIN (HCC): ICD-10-CM

## 2025-02-11 NOTE — TELEPHONE ENCOUNTER
Endocrine Refill protocol for metformin    Protocol Criteria:  PASSED  Reason: N/A    If all below requirements are met, send a 90-day supply with 1 refill per provider protocol.     Verify appointment with Endocrinology completed in the last 6 months or scheduled in the next 3 months.  Verify A1C has been completed within the last 6 months and is below 8.5%  Verify last GFR is greater than or equal to 40 in the past 12 months    Last completed office visit:11/13/2024 Luna Lemons APRN   Next scheduled Follow up:   Future Appointments   Date Time Provider Department Center   2/12/2025 11:00 AM Luna Lemons APRN [facility]       Last GFR result:    Lab Results   Component Value Date    EGFRCR 106 08/12/2024     Last A1c result: Last A1C result: 7.6% done 11/13/2024.

## 2025-02-21 DIAGNOSIS — E11.65 TYPE 2 DIABETES MELLITUS WITH HYPERGLYCEMIA, WITH LONG-TERM CURRENT USE OF INSULIN (HCC): ICD-10-CM

## 2025-02-21 DIAGNOSIS — Z79.4 TYPE 2 DIABETES MELLITUS WITH HYPERGLYCEMIA, WITH LONG-TERM CURRENT USE OF INSULIN (HCC): ICD-10-CM

## 2025-02-21 RX ORDER — ACYCLOVIR 800 MG/1
TABLET ORAL
Qty: 2 EACH | Refills: 3 | Status: SHIPPED | OUTPATIENT
Start: 2025-02-21

## 2025-02-21 NOTE — TELEPHONE ENCOUNTER
Endocrine Refill protocol for CGM supplies     Protocol Criteria:  PASSED Reason: N/A    If below requirement is met, send a 90-day supply with 1 refill per provider protocol.     Verify appointment with Endocrinology completed in the last 12 months or scheduled in the next 6 months     Last completed office visit:11/13/2024 Luna Lemons APRN   Next scheduled Follow up: No future appointments.

## 2025-03-23 DIAGNOSIS — E11.65 TYPE 2 DIABETES MELLITUS WITH HYPERGLYCEMIA, WITH LONG-TERM CURRENT USE OF INSULIN (HCC): ICD-10-CM

## 2025-03-23 DIAGNOSIS — E11.8 DIABETES MELLITUS WITH COMPLICATION (HCC): ICD-10-CM

## 2025-03-23 DIAGNOSIS — Z79.4 TYPE 2 DIABETES MELLITUS WITH HYPERGLYCEMIA, WITH LONG-TERM CURRENT USE OF INSULIN (HCC): ICD-10-CM

## 2025-03-24 RX ORDER — ACYCLOVIR 800 MG/1
TABLET ORAL
Qty: 2 EACH | Refills: 3 | Status: SHIPPED | OUTPATIENT
Start: 2025-03-24

## 2025-03-24 RX ORDER — INSULIN DEGLUDEC 100 U/ML
INJECTION, SOLUTION SUBCUTANEOUS
Qty: 30 ML | Refills: 1 | Status: SHIPPED | OUTPATIENT
Start: 2025-03-24

## 2025-03-24 NOTE — TELEPHONE ENCOUNTER
Endocrine Refill protocol for oral and injectable diabetic medications    Protocol Criteria:  PASSED  Reason: N/A    If all below requirements are met, send a 90-day supply with 1 refill per provider protocol.    Verify appointment with Endocrinology completed in the last 6 months or scheduled in the next 3 months.  Verify A1C has been completed within the last 6 months and is below 8.5%     Last completed office visit: 11/13/2024 Luna Lemons APRN   Next scheduled Follow up: No future appointments.   Last A1c result: Last A1c value was 7.6% done 11/13/2024.    Endocrine refill protocol for basal insulins     Protocol Criteria: PASSED Reason: N/A    If all below requirements are met, send a 90-day supply with 1 refill per provider protocol.       Verify Appointment with Endocrinology completed in the last 6 months or scheduled in the next 3 months.  Verify A1C has been completed within the last 6 months and is below 8.5%     Last completed office visit:11/13/2024 Luna Lemons, MUKUND   Next scheduled Follow up: No future appointments.   Last A1c result: Last A1c value was 7.6% done 11/13/2024.     Passed and sent per protocol.

## 2025-03-31 DIAGNOSIS — Z79.4 TYPE 2 DIABETES MELLITUS WITH HYPERGLYCEMIA, WITH LONG-TERM CURRENT USE OF INSULIN (HCC): Primary | ICD-10-CM

## 2025-03-31 DIAGNOSIS — E78.2 MIXED HYPERLIPIDEMIA: ICD-10-CM

## 2025-03-31 DIAGNOSIS — E11.65 TYPE 2 DIABETES MELLITUS WITH HYPERGLYCEMIA, WITH LONG-TERM CURRENT USE OF INSULIN (HCC): Primary | ICD-10-CM

## 2025-03-31 RX ORDER — ATORVASTATIN CALCIUM 80 MG/1
80 TABLET, FILM COATED ORAL NIGHTLY
Qty: 30 TABLET | Refills: 0 | Status: SHIPPED | OUTPATIENT
Start: 2025-03-31

## 2025-03-31 NOTE — TELEPHONE ENCOUNTER
I refilled 30 days for now. For more refill, patient needs a follow up appointment back , I sent my chart message

## 2025-03-31 NOTE — TELEPHONE ENCOUNTER
Endocrine refill protocol for lipid lowering medications    Protocol Criteria:  PASSED Reason: N/A    If all below requirements are met, send a 90-day supply with 1 refill per provider protocol.    Verify appointment with Endocrinology completed in the last 6 months or scheduled in the next 3 months.  Lipid panel must have been completed in the last 12 months   ALT result below 80  LDL result below 130    Last completed office visit:11/13/2024 Luna Lemons APRN   Next scheduled Follow up: No future appointments.   Last Lipid panel date: Cholesterol: 115, done on 8/12/2024.  HDL Cholesterol: 28, done on 8/12/2024.  TriGlycerides 118, done on 8/12/2024.  LDL Cholesterol: 65, done on 8/12/2024.     Last ALT result: Last ALT was 18 done on 8/12/2024.  Last AST was 23 done on 8/12/2024.    Cholesterol Meds: atorvastatin Tabs - 80 MG , not missing the dose, given by cardiologist.

## 2025-04-17 DIAGNOSIS — I25.10 CORONARY ARTERY DISEASE INVOLVING NATIVE CORONARY ARTERY OF NATIVE HEART WITHOUT ANGINA PECTORIS: ICD-10-CM

## 2025-04-17 RX ORDER — METOPROLOL TARTRATE 50 MG
50 TABLET ORAL 2 TIMES DAILY
Qty: 180 TABLET | Refills: 1 | Status: SHIPPED | OUTPATIENT
Start: 2025-04-17

## 2025-04-17 NOTE — TELEPHONE ENCOUNTER
Endocrine Refill protocol for oral and injectable diabetic medications    Protocol Criteria:  FAILED  Reason: No Visit in required time frame    If all below requirements are met, send a 90-day supply with 1 refill per provider protocol.    Verify appointment with Endocrinology completed in the last 6 months or scheduled in the next 3 months.  Verify A1C has been completed within the last 6 months and is below 8.5%     Last completed office visit: 8/12/25- Dr. Nguyen  Next scheduled Follow up: No future appointments.   Last A1c result: Last A1c value was 7.6% done 11/13/2024.

## 2025-04-24 DIAGNOSIS — E11.65 TYPE 2 DIABETES MELLITUS WITH HYPERGLYCEMIA, WITH LONG-TERM CURRENT USE OF INSULIN (HCC): ICD-10-CM

## 2025-04-24 DIAGNOSIS — Z79.4 TYPE 2 DIABETES MELLITUS WITH HYPERGLYCEMIA, WITH LONG-TERM CURRENT USE OF INSULIN (HCC): ICD-10-CM

## 2025-04-25 RX ORDER — ACYCLOVIR 800 MG/1
TABLET ORAL
Qty: 2 EACH | Refills: 3 | OUTPATIENT
Start: 2025-04-25

## 2025-05-19 DIAGNOSIS — Z79.4 TYPE 2 DIABETES MELLITUS WITH HYPERGLYCEMIA, WITH LONG-TERM CURRENT USE OF INSULIN (HCC): ICD-10-CM

## 2025-05-19 DIAGNOSIS — E11.65 TYPE 2 DIABETES MELLITUS WITH HYPERGLYCEMIA, WITH LONG-TERM CURRENT USE OF INSULIN (HCC): ICD-10-CM

## 2025-05-20 NOTE — TELEPHONE ENCOUNTER
Endocrine Refill protocol for metformin    Protocol Criteria:  PASSED  Reason: N/A    If all below requirements are met, send a 90-day supply with 1 refill per provider protocol.     Verify appointment with Endocrinology completed in the last 6 months or scheduled in the next 3 months.  Verify A1C has been completed within the last 6 months and is below 8.5%  Verify last GFR is greater than or equal to 40 in the past 12 months    Last completed office visit:11/13/2024 Luna Lemons APRN   Last completed telemed visit: Visit date not found  Next scheduled Follow up: No future appointments.    Last GFR result:    Lab Results   Component Value Date    EGFRCR 106 08/12/2024     Last A1c result: Last A1C result: 7.6% done 11/13/2024.     
No

## 2025-06-03 ENCOUNTER — OFFICE VISIT (OUTPATIENT)
Facility: CLINIC | Age: 55
End: 2025-06-03
Payer: COMMERCIAL

## 2025-06-03 VITALS
BODY MASS INDEX: 19.61 KG/M2 | OXYGEN SATURATION: 97 % | SYSTOLIC BLOOD PRESSURE: 112 MMHG | HEIGHT: 70 IN | HEART RATE: 80 BPM | WEIGHT: 137 LBS | DIASTOLIC BLOOD PRESSURE: 66 MMHG

## 2025-06-03 DIAGNOSIS — E11.69 HYPERLIPIDEMIA ASSOCIATED WITH TYPE 2 DIABETES MELLITUS (HCC): ICD-10-CM

## 2025-06-03 DIAGNOSIS — Z13.89 SCREENING FOR NEPHROPATHY: ICD-10-CM

## 2025-06-03 DIAGNOSIS — E11.59 HYPERTENSION ASSOCIATED WITH DIABETES (HCC): ICD-10-CM

## 2025-06-03 DIAGNOSIS — Z79.4 TYPE 2 DIABETES MELLITUS WITH HYPERGLYCEMIA, WITH LONG-TERM CURRENT USE OF INSULIN (HCC): Primary | ICD-10-CM

## 2025-06-03 DIAGNOSIS — E11.65 TYPE 2 DIABETES MELLITUS WITH HYPERGLYCEMIA, WITH LONG-TERM CURRENT USE OF INSULIN (HCC): Primary | ICD-10-CM

## 2025-06-03 DIAGNOSIS — E78.5 HYPERLIPIDEMIA ASSOCIATED WITH TYPE 2 DIABETES MELLITUS (HCC): ICD-10-CM

## 2025-06-03 DIAGNOSIS — I15.2 HYPERTENSION ASSOCIATED WITH DIABETES (HCC): ICD-10-CM

## 2025-06-03 LAB — HEMOGLOBIN A1C: 7.6 % (ref 4.3–5.6)

## 2025-06-03 PROCEDURE — 83036 HEMOGLOBIN GLYCOSYLATED A1C: CPT | Performed by: NURSE PRACTITIONER

## 2025-06-03 PROCEDURE — 99214 OFFICE O/P EST MOD 30 MIN: CPT | Performed by: NURSE PRACTITIONER

## 2025-06-03 PROCEDURE — 95251 CONT GLUC MNTR ANALYSIS I&R: CPT | Performed by: NURSE PRACTITIONER

## 2025-06-03 RX ORDER — INSULIN DEGLUDEC 100 U/ML
INJECTION, SOLUTION SUBCUTANEOUS
Qty: 30 ML | Refills: 1 | Status: SHIPPED | OUTPATIENT
Start: 2025-06-03

## 2025-06-03 NOTE — PATIENT INSTRUCTIONS
Return Visit:  APN: Return in about 3 months (around 9/3/2025) for diabetes follow up.  [] Video visit  [x] In person only    []  Directions to 1st floor lab     Summary of today's visit:    Last A1c value was 7.6% done 6/3/2025.  TIR in the last 2 weeks 71%    Medication changes:    Insulin: Increase  Tresiba 14 unit  to 18 units every morning   Continue  Metformin 1000 mg twice daily, Jardiance 25 mg daily  - message me in about 4-6 weeks if target range is still below 70% and will increase units of insulin,  - if getting glucose less than 70, split tresiba from 18units /day to 9 units every 12 hours.   - - check your glucose with fingerstick machine/glucometer if your continous glucose monitor (CGM) tells you that your glucose is below 70 to clarify. Follow the glucometer's number if you need to treat it with 15/15 rule or 30/15 rule   - Incorporate some more protein in each meal, try to maintain less 45-60 grams of carbs in big meals and if snacking less than 20 g of carbs  - exerice at least 22 mins each day or 150 mins each week     - Schedule eye exam  prior visit.   Ophthalmologist for diabetes eye exam:  [facility] ( with various locations:[facility], [facility], [place]) :  -  [phone] or [phone]    [place]:   - [facility] ( ophthalmology): Phone: [phone], Address: [address] [place] [address]  - [facility](accepts MEDICAID) ( ophthalmology)- [phone]  - [place] [facility] associate: Dr. Cuevas or Dr. Carbajal  ( ophthalmology)- [phone]  -[facility]: Dr Marla Orona- PHONE: [phone], [address] [place] [address]  [facility] [address], [place] [address]    [place]:  - Dr. Oppenheim( ophthalmology)- [phone]  - [facility] ( optometrist)- [phone]- [facility], or [employer]    [facility]: [facility]( optometrist) - [phone]    [place]: Walter Swain-  [facility] ( optometrist)- [phone] they can do screenings using their machine    [place]: [facility] ( optometrist)- [phone]     [place]: Loan Shirley MD ( ophthalmology) -  [phone]      - If on insulin, please ensure you have glucagon at home for emergencies   - Targeted glucose levels  - before breakfast or fasting glucose (at least 8-10 hrs of no food/sweetened beverage intake)    -  2 hrs after lunch or dinner < 180   -  before lunch or dinner <140  -  Follow 15g/15 min rule if you develop any hypoglycemia symptoms w/ glucose <70   -  but if glucose <55 follow 30g/15 min rule. Once glucose above 80, eat a protein or food w protein ie cheese, peanut butter, almond butter, cheese, yogurt.     Medication changes  These Medications Have Changed       Start Taking Instead of    insulin degludec (TRESIBA FLEXTOUCH) 100 UNIT/ML Subcutaneous Solution Pen-injector insulin degludec (TRESIBA FLEXTOUCH) 100 UNIT/ML Subcutaneous Solution Pen-injector    Dosage:  Inject 18 units every morning Actively titrating per endo instruction(For insurance purposes only: TDD max: 32 units per day) Dosage:  Inject 12 units every morning (For insurance purposes only: TDD max: 15 units per day)    Continuous Glucose Sensor (FREESTYLE VERONIKA 3 PLUS SENSOR) Does not apply Misc Continuous Glucose Sensor (FREESTYLE VERONIKA 3 PLUS SENSOR) Does not apply Misc    Dosage:  1 each every 15 (fifteen) days. Use as directed. One sensor every 15 days. - Does not apply Dosage:  1 each every 14 (fourteen) days. Use as directed. One sensor every 15 days. - Does not apply    Starting on: 6/5/2025               Additional comments:   - If labs were ordered today, please complete prior   - Please let us know if you require any refills at least 1 week prior to your medication running out   - Please call our office if sugars at home are consistently greater than 250 or less than 70   - The on-call pager is for emergencies  only. If you are a type 1 diabetic and run out of insulin after business hours 8AM-4PM, you may call the on-call pager for a refill to a 24 hour pharmacy. All other refill requests and forms that can our clinic can help to fill out should be requested during business hours.       HOW TO TREAT LOW BLOOD SUGAR (Hypoglycemia)  Low blood sugar= Less than 70, or if you start to have symptoms (below)  Symptoms: Shaking or trembling, fast heart rate, extreme hunger, sweating, confusion/difficulty concentrating, dizziness.    How to treat a low blood sugar if you are able to eat/drink: The Rule of 15/15  If you are using continuous glucose monitor that says you are low, but you do not have any symptoms, verify on fingerstick that your blood sugar is actually low before treating.   Eat 15 grams of carbs (see examples below)  Check your blood sugar after 15 minutes. If it’s still below your target range, have another serving.   Repeat these steps until it’s in your target range. Once it’s in range, if you're nervous about your sugar going low again, have a protein source (ie, a spoonful of peanut butter).   If you have a CGM you want to look for how your arrow has changed. If you arrow is pointed up or sideways after 15 min, give your CGM more time OR check with a finger stick. Try not to eat more food until at least 15 min after the first BG check - otherwise you risk having a rebound high.  If you are experiencing symptoms and you are unable to check your blood glucose for any reason, treat the hypoglycemia.  If someone has a low blood sugar and is unconscious: Don’t hesitate to call 911. If someone is unconscious and glucagon is not available or someone does not know how to use it, call 911 immediately.     To treat a low glucose <70, I recommend you carry with you easy, pre-portioned treatment for low blood sugars that are 15G of carbs:   - Children sized squeeze pouch applesauce (low fiber)  - Small children's sized  juicebox- 15g carb --> 4oz juice box  - Glucose tablets from [employer], you can find them near diabetes supplies --> Note, you will need to eat 3-4 tablets to get to 15g of carbs  - Children sized fruit snack pack- look for one with 15 grams of total carbohydrate  - Choice of how to treat your low is important. Complex carbs, or foods that contain fats along with carbs, (like chocolate) can slow the absorption of glucose and should NOT be used to treat an emergency low   - carbs that has protein (such as glucerna) will not increase your glucose immediately. Avoid protein when glucose is below 70.

## 2025-06-03 NOTE — PROGRESS NOTES
[facility]    Name: Shaq Boone    Date: 25   Shaq Boone is a 54 year old male who presents for evaluation of T2DM management.     Chief complaint: Follow - Up (PT here for routine T2DM f/u, per pt no current concerns or sx./Last A1c value was 7.6% done 2024.)       Subjective:   DM hx:  -Diagnosed with diabetes in  while he had NSTEMI/CAD s/p CABG at that time, antibodies negative    C peptide 1.46- 22  -Family history- none known      Initial HPI consult - 24: Last office visit for DM mgmt of the pt:  7/10/24  Mgmt by: Amber Guidry NP from [facility]  DM meds at first office visit:Metformin 500 m tabs twice daily with meals, Jardiance 25 mg daily ( 10 days ago increased by cardiology), Tresiba 10 units qam    episodes of hypoglycemia: No    Continuous Glucose Monitoring Interpretation  Shaq Boone has undergone continuous glucose monitoring with the Via6yle Ced 3 CGM with phone (connected to clinic profile).  The blood glucose tracings were evaluated for two weeks prior to office visit. Blood glucose tracings demonstrated no significant hyperglycemia. There were no significant hypoglycemia notedduring the weeks of evaluation.    Date: -24: TIR 78%, GMI 7.1% ,  low 0% , very low 0%, SD 25.5mg/dl, Sensor usage: 97%      -Re: potential DM medication contraindication (if positive, checkbox selected):  [] History of pancreatitis- denies   [] Personal/fam hx of medullary thyroid cancer/MEN2- denies   [] History of recent/frequent UTI/yeast infxn- denies   [] Previous amputation related to diabetes-denies     -Presence of associated DM complications (if positive, checkbox selected):  [x] Macrovascular complications (CAD/CVA/PAD) +CAD, +CABG  [] Neuropathy- denies   [x] Retinopathy  [] Nephropathy-denies   [] HTN- denies   [x] Hyperlipidemia  [] Stroke/TIA- denies   [] Gastroparesis- denies     -Lifestyle: eat 3xa  day w/ snacks. Walking daily about 30 mins, casual walk  - Modifying factors: States jardiance was not available in [place], when he went to [facility] last 2023 for 3 months.     Previously trialed/failed DM meds: Humalog, Levemir, Novolin R    Interim Hx with Luna APN 24:   Current treatment: Tresiba 10 units Metformin 500 m tabs twice daily with meals and Jardiance 25 mg daily  Testing: Continuous Glucose Monitoring Interpretation  Shaq Boone  has undergone continuous glucose monitoring with the Freestyle Ced 3+ CGM with phone (connected to clinic profile).  The blood glucose tracings were evaluated for two weeks prior to office visit. Blood glucose tracings demonstrated areas of hyperglycemia after lunch . There were occasional hypoglycemia, particularly after 2amduring the weeks of evaluation.    Date: 10/31-24: TIR 68%, GMI 7.3% ,  low 0% , very low 0%, SD 27.6mg/dl, Sensor usage: 97%  Hypoglycemia: as above  New complication related to DM:      INTERIM HX with Luna, APRN 25 :    Here for follow up  Concern:  Current treatment: Metformin 1000 mg twice daily, Jardiance 25 mg daily, Tresiba 14 unit every morning   Testing: ced not downloading  Date: -06/3/25: TIR 71%, GMI % ,  low 0% , very low 0%,   Continuous Glucose Monitoring Interpretation  Shaq Boone  has undergone continuous glucose monitoring with the Freestyle Ced 3 CGM with phone (connected to clinic profile).  The blood glucose tracings were evaluated for two weeks prior to office visit. Blood glucose tracings demonstrated areas of hyperglycemia cannot see in the phone . There were no significant hypoglycemia notedduring the weeks of evaluation.           Hypoglycemia: reported by patient mid 60's, typically in the morning beforebreakfast  Vision: noted blurry vision when eating more.   Numbness and tingling to extremities:denies   Wound denies   Diet/Activities: eat 3x a day  New complication related to DM:    no hospitalization and no ER visit since last office visit.   Upcoming procedure or surgery: denies       History/Other:    Allergies, PMH, SocHx and FHx reviewed and updated as appropriate in Epic on    insulin degludec (TRESIBA FLEXTOUCH) 100 UNIT/ML Subcutaneous Solution Pen-injector Inject 18 units every morning Actively titrating per endo instruction(For insurance purposes only: TDD max: 32 units per day) 30 mL 1    METFORMIN 500 MG Oral Tab TAKE 2 TABLETS BY MOUTH TWICE A DAY WITH MEALS 360 tablet 0    METOPROLOL TARTRATE 50 MG Oral Tab TAKE 1 TABLET BY MOUTH 2 TIMES DAILY (BETA BLOCKER). 180 tablet 1    atorvastatin 80 MG Oral Tab Take 1 tablet (80 mg total) by mouth nightly. 30 tablet 0    empagliflozin (JARDIANCE) 25 MG Oral Tab Take 1 tablet (25 mg total) by mouth daily. 90 tablet 1    aspirin 81 MG Oral Tab EC Take 1 tablet (81 mg total) by mouth daily. 30 tablet 5    Insulin Pen Needle 32G X 4 MM Does not apply Misc Use daily with insulin pen 100 each 0    finasteride 5 MG Oral Tab Take 1 tablet (5 mg total) by mouth daily. 90 tablet 3    Ferrous Sulfate 325 (65 Fe) MG Oral Tab Take 1 tablet (325 mg total) by mouth daily with breakfast.  0    Glucose Blood (ONETOUCH VERIO) In Vitro Strip 4x daily 125 strip 5    Continuous Glucose Sensor (FREESTYLE VERONIKA 3 PLUS SENSOR) Does not apply Misc 1 each every 15 (fifteen) days. Use as directed. One sensor every 15 days. 6 each 3     No Known Allergies  Current Outpatient Medications   Medication Sig Dispense Refill    insulin degludec (TRESIBA FLEXTOUCH) 100 UNIT/ML Subcutaneous Solution Pen-injector Inject 18 units every morning Actively titrating per endo instruction(For insurance purposes only: TDD max: 32 units per day) 30 mL 1    METFORMIN 500 MG Oral Tab TAKE 2 TABLETS BY MOUTH TWICE A DAY WITH MEALS 360 tablet 0    METOPROLOL TARTRATE 50 MG Oral Tab TAKE 1 TABLET BY MOUTH 2 TIMES DAILY (BETA BLOCKER). 180 tablet 1    atorvastatin 80 MG Oral Tab Take 1  tablet (80 mg total) by mouth nightly. 30 tablet 0    empagliflozin (JARDIANCE) 25 MG Oral Tab Take 1 tablet (25 mg total) by mouth daily. 90 tablet 1    aspirin 81 MG Oral Tab EC Take 1 tablet (81 mg total) by mouth daily. 30 tablet 5    Insulin Pen Needle 32G X 4 MM Does not apply Misc Use daily with insulin pen 100 each 0    finasteride 5 MG Oral Tab Take 1 tablet (5 mg total) by mouth daily. 90 tablet 3    Ferrous Sulfate 325 (65 Fe) MG Oral Tab Take 1 tablet (325 mg total) by mouth daily with breakfast.  0    Glucose Blood (ONETOUCH VERIO) In Vitro Strip 4x daily 125 strip 5    Continuous Glucose Sensor (FREESTYLE VERONIKA 3 PLUS SENSOR) Does not apply Misc 1 each every 15 (fifteen) days. Use as directed. One sensor every 15 days. 6 each 3     Past Medical History:    Diabetes (HCC)    High cholesterol    Other and unspecified hyperlipidemia    Stress    Wears glasses     History reviewed. No pertinent family history.  Social history: Reviewed.      ROS/Exam    REVIEW OF SYSTEMS: Ten point review of systems has been performed and is otherwise negative and/or non-contributory, except as described above.     VITALS  Vitals:    06/03/25 1153   BP: 112/66   Pulse: 80   SpO2: 97%   Weight: 137 lb (62.1 kg)   Height: 5' 10\" (1.778 m)           Wt Readings from Last 6 Encounters:   06/03/25 137 lb (62.1 kg)   11/13/24 132 lb (59.9 kg)   09/11/24 130 lb (59 kg)   08/12/24 130 lb (59 kg)   05/30/24 137 lb 3.2 oz (62.2 kg)   01/20/23 133 lb (60.3 kg)       PHYSICAL EXAM  CONSTITUTIONAL:  awake, alert, cooperative, no apparent distress, and appears stated age Vss stable   PSYCH: normal affect  LUNGS: breathing comfortably  CARDIOVASCULAR:  regular rate   NECK:  no palpable thyroid nodules     Labs/Imaging:   Lab Results   Component Value Date    CHOLEST 115 08/12/2024    CHOLEST 88 08/05/2022    TRIG 118 08/12/2024    TRIG 121 08/05/2022    HDL 28 (L) 08/12/2024    HDL 30 (L) 08/05/2022    LDL 65 08/12/2024    LDL 36  08/05/2022     No results found for: \"MICROALBCREA\"   Lab Results   Component Value Date    CREATSERUM 0.81 08/12/2024    CREATSERUM 0.84 10/31/2022    EGFRCR 106 08/12/2024    EGFRCR 106 10/31/2022     Lab Results   Component Value Date    AST 23 08/12/2024    AST 15 10/31/2022    ALT 18 08/12/2024    ALT 26 10/31/2022       Lab Results   Component Value Date    TSH 1.306 08/12/2024    TSH 1.100 06/22/2022       Overall glucose control:  Lab Results   Component Value Date    A1C 7.6 (A) 06/03/2025    A1C 7.6 (A) 11/13/2024    A1C 8.1 (H) 08/12/2024    A1C 8.5 (A) 05/30/2024    A1C 7.3 (A) 12/15/2022       Supplementary Documentation:   -Surveillance for Diabetes Complications & Risks  Foot exam/neuropathy: Last Foot Exam: 08/12/24      Retinopathy screening: No data recordedNo data recorded   In 2022, last eye exam , no DR at that time per pt. Done in [facility] practice. Patient has upcoming appt on Nov 20, 2024    Assessment & Plan:     ICD-10-CM    1. Type 2 diabetes mellitus with hyperglycemia, with long-term current use of insulin (HCC)  E11.65 POC Hemoglobin A1C    Z79.4 insulin degludec (TRESIBA FLEXTOUCH) 100 UNIT/ML Subcutaneous Solution Pen-injector     GLUC MNTR CONT REC FROM [facility] TISS FLU PHYS I&R     Continuous Glucose Sensor (FREESTYLE VERONIKA 3 PLUS SENSOR) Does not apply Misc      2. Screening for nephropathy  Z13.89       3. Hypertension associated with diabetes (HCC)  E11.59     I15.2       4. Hyperlipidemia associated with type 2 diabetes mellitus (HCC)  E11.69     E78.5               Shaq Boone is a pleasant 54 year old male here for evaluation of:    #Diabetes w/ retinopathy- PMHx of Type 2 diabetes mellitus diagnosed in 2022 while he had NSTEMI/CAD s/p CABG at that time.   - AutoAB for type 1 DM: IA2 (-), Islet cell( no result), SUGEY 65(-), ZnT8 (- ); C peptide:detected: 8/17/22  - Last A1c value was 7.6% done 6/3/2025.     -Discussed  Goal <7%. Importance of better glucose  control in preventing onset/progression of end-organ damage discussed, as well as goals of therapy and clinical significance of A1C.  Plan: A1c still the same, reminded him to get his labs done. Recommended to increase tresiba. We discussed that he should message me in 4 weeks if target range is still below 70% and will further adjust medicine.  Reviewed continous glucose monitor (CGM) and noted TIR is at 71%  #BMI 19- weight improving, due to his weight, will not start glp1-a.  normal BMI, continue balanced diet and exercise.  - med changes:  Insulin: Increase  Tresiba 14 unit  to 18 units every morning   Continue  Metformin 1000 mg twice daily, Jardiance 25 mg daily  - message me in about 4-6 weeks if target range is still below 70% and will increase units of insulin,  - if getting glucose less than 70, split tresiba from 18units /day to 9 units every 12 hours.   - - check your glucose with fingerstick machine/glucometer if your continous glucose monitor (CGM) tells you that your glucose is below 70 to clarify. Follow the glucometer's number if you need to treat it with 15/15 rule or 30/15 rule   - Incorporate some more protein in each meal, try to maintain less 45-60 grams of carbs in big meals and if snacking less than 20 g of carbs  - exerice at least 22 mins each day or 150 mins each week  - Recommended to patient to see ophthalmology/optometrist, given the patient list of ophthalmology/optometrist   - continue Freestyle Ced 3+ CGM with phone (connected to clinic profile)  - patient is on insulin, and has suboptimal glycemic control including wide glycemic swings, thus would benefit from CGM  - meet with endo DM educator re:BG check in 6 weeks.   - previously intolerant: Humalog  - no strict contraindication for SGLT2i, metformin   -See above header \"Supplementary Documentation\" for surveillance for diabetes complications & risks  - Discussed targeted glucose levels and symptoms and treatment of hypoglycemia  w/ 15/15 rule for glucose 55-70 and 30/15 rule w/ glucose < 55. Also to f/u w/ protein or meal after glucose went up to 80.   - referred patient to dietician for further discussion of getting balanced diet with uncontrolled DM and  hx of CABG.     #Nephropathy screening:   Lab Results   Component Value Date    EGFRCR 106 08/12/2024    Plan:ordered UCMR, Reminded him to get labs done.     #HTN: SBP is to goal <130 , per PCP   BP Readings from Last 1 Encounters:   06/03/25 112/66   BP Meds: metoprolol tartrate Tabs - 50 MG     #Hyperlipidemia/Lipids: LDL is not to goal   Lab Results   Component Value Date    LDL 65 08/12/2024    TRIG 118 08/12/2024   Cholesterol Meds: atorvastatin Tabs - 80 MG , not missing the dose, given by cardiologist.   - Discussed decrease saturated fat, trans fat, and cholesterol intake  example: Butter, lard, shortening, coconut, coconut oil, palm oil, fried food, bologna,pepperoni, salami, egg yolks, Poultry skin, visible meat fat.  Plan: Reminded him to get labs done, checking direct ldl , might consider adding zetia if LDL still above 55.       Return in about 3 months (around 9/3/2025) for diabetes follow up.     Thank you for allowing me to participate in the care of this patient.  Please feel free to contact me with any questions.    MUKUND Cameron, [facility]  Endocrinology, Diabetes & Metabolism   06/03/25     In reviewing this note, please be advised that Dragon Voice Recognition software used to dictate the note may have made errors in recognizing some of the words or phrases.     Note to patient: The 21 Century Cures Act makes medical notes like these available to patients in the interest of transparency. However, be advised this is a medical document. It is intended as peer to peer communication. It is written in medical language and may contain abbreviations or verbiage that are unfamiliar. It may appear blunt or direct. Medical documents are intended to carry relevant  information, facts as evident, and the clinical opinion of the practitioner.

## 2025-06-05 RX ORDER — HYDROCHLOROTHIAZIDE 12.5 MG/1
1 CAPSULE ORAL
Qty: 6 EACH | Refills: 3 | Status: SHIPPED | OUTPATIENT
Start: 2025-06-05

## 2025-06-11 DIAGNOSIS — E78.2 MIXED HYPERLIPIDEMIA: ICD-10-CM

## 2025-06-12 RX ORDER — ATORVASTATIN CALCIUM 80 MG/1
80 TABLET, FILM COATED ORAL NIGHTLY
Qty: 90 TABLET | Refills: 0 | Status: SHIPPED | OUTPATIENT
Start: 2025-06-12

## 2025-06-12 NOTE — TELEPHONE ENCOUNTER
Endocrine refill protocol for lipid lowering medications    Protocol Criteria:  PASSED Reason: N/A    If all below requirements are met, send a 90-day supply with 1 refill per provider protocol.    Verify appointment with Endocrinology completed in the last 6 months or scheduled in the next 3 months.  Lipid panel must have been completed in the last 12 months   ALT result below 80  LDL result below 130    Last completed office visit:6/3/2025 Luna Lemons APRN   Last completed telemed visit: Visit date not found  Next scheduled Follow up:   Future Appointments   Date Time Provider Department Center   8/19/2025  5:30 PM Epi Nguyen MD [facility] [address]      Last Lipid panel date: Cholesterol: 115, done on 8/12/2024.  HDL Cholesterol: 28, done on 8/12/2024.  TriGlycerides 118, done on 8/12/2024.  LDL Cholesterol: 65, done on 8/12/2024.     Last ALT result: Last ALT was 18 done on 8/12/2024.  Last AST was 23 done on 8/12/2024.

## 2025-06-12 NOTE — TELEPHONE ENCOUNTER
FYI- I responded to the patient in my chart message and  waiting for the patient's response.   Refilled atorvastatin

## 2025-08-05 ENCOUNTER — WALK IN (OUTPATIENT)
Dept: URGENT CARE | Age: 55
End: 2025-08-05

## 2025-08-05 VITALS
HEART RATE: 66 BPM | SYSTOLIC BLOOD PRESSURE: 115 MMHG | OXYGEN SATURATION: 99 % | RESPIRATION RATE: 16 BRPM | DIASTOLIC BLOOD PRESSURE: 76 MMHG | TEMPERATURE: 98.9 F

## 2025-08-05 DIAGNOSIS — L08.9 SKIN INFECTION: Primary | ICD-10-CM

## 2025-08-05 PROCEDURE — 99204 OFFICE O/P NEW MOD 45 MIN: CPT | Performed by: INTERNAL MEDICINE

## 2025-08-05 RX ORDER — ASPIRIN 81 MG/1
81 TABLET ORAL DAILY
COMMUNITY

## 2025-08-05 RX ORDER — METOPROLOL TARTRATE 50 MG
50 TABLET ORAL 2 TIMES DAILY
COMMUNITY

## 2025-08-05 RX ORDER — INSULIN DEGLUDEC 100 U/ML
INJECTION, SOLUTION SUBCUTANEOUS
COMMUNITY

## 2025-08-05 RX ORDER — BLOOD-GLUCOSE METER
EACH MISCELLANEOUS
COMMUNITY
Start: 2016-01-05

## 2025-08-05 RX ORDER — ATORVASTATIN CALCIUM 80 MG/1
80 TABLET, FILM COATED ORAL
COMMUNITY
Start: 2025-06-12

## 2025-08-16 DIAGNOSIS — E11.65 TYPE 2 DIABETES MELLITUS WITH HYPERGLYCEMIA, WITH LONG-TERM CURRENT USE OF INSULIN (HCC): ICD-10-CM

## 2025-08-16 DIAGNOSIS — Z79.4 TYPE 2 DIABETES MELLITUS WITH HYPERGLYCEMIA, WITH LONG-TERM CURRENT USE OF INSULIN (HCC): ICD-10-CM

## 2025-08-19 ENCOUNTER — OFFICE VISIT (OUTPATIENT)
Dept: INTERNAL MEDICINE CLINIC | Facility: CLINIC | Age: 55
End: 2025-08-19

## 2025-08-19 VITALS
SYSTOLIC BLOOD PRESSURE: 122 MMHG | DIASTOLIC BLOOD PRESSURE: 72 MMHG | TEMPERATURE: 98 F | BODY MASS INDEX: 19.76 KG/M2 | RESPIRATION RATE: 16 BRPM | HEIGHT: 70 IN | HEART RATE: 77 BPM | WEIGHT: 138 LBS | OXYGEN SATURATION: 98 %

## 2025-08-19 DIAGNOSIS — Z12.11 COLON CANCER SCREENING: ICD-10-CM

## 2025-08-19 DIAGNOSIS — Z12.5 PROSTATE CANCER SCREENING: ICD-10-CM

## 2025-08-19 DIAGNOSIS — N48.30 PRIAPISM: ICD-10-CM

## 2025-08-19 DIAGNOSIS — Z00.00 ROUTINE GENERAL MEDICAL EXAMINATION AT A HEALTH CARE FACILITY: ICD-10-CM

## 2025-08-19 DIAGNOSIS — Z00.00 ANNUAL PHYSICAL EXAM: Primary | ICD-10-CM

## 2025-08-19 DIAGNOSIS — E11.8 DIABETES MELLITUS WITH COMPLICATION (HCC): ICD-10-CM

## 2025-08-19 PROCEDURE — 99396 PREV VISIT EST AGE 40-64: CPT | Performed by: INTERNAL MEDICINE

## 2025-08-19 PROCEDURE — 99213 OFFICE O/P EST LOW 20 MIN: CPT | Performed by: INTERNAL MEDICINE

## 2025-08-20 ENCOUNTER — TELEPHONE (OUTPATIENT)
Facility: CLINIC | Age: 55
End: 2025-08-20

## 2025-08-20 DIAGNOSIS — Z79.4 TYPE 2 DIABETES MELLITUS WITH HYPERGLYCEMIA, WITH LONG-TERM CURRENT USE OF INSULIN (HCC): ICD-10-CM

## 2025-08-20 DIAGNOSIS — E11.65 TYPE 2 DIABETES MELLITUS WITH HYPERGLYCEMIA, WITH LONG-TERM CURRENT USE OF INSULIN (HCC): ICD-10-CM

## (undated) DEVICE — SUT PROLENE 7-0 CC M8705

## (undated) DEVICE — SUT PROLENE 6-0 C-1 M8718

## (undated) DEVICE — SOL LACT RINGERS 1000ML

## (undated) DEVICE — FLOSEAL HEMOSTATIC MATRIX, 5ML: Brand: FLOSEAL HEMOSTATIC MATRIX

## (undated) DEVICE — 3M™ TEGADERM™ TRANSPARENT FILM DRESSING, 1626W, 4 IN X 4-3/4 IN (10 CM X 12 CM), 50 EACH/CARTON, 4 CARTON/CASE: Brand: 3M™ TEGADERM™

## (undated) DEVICE — SUT PROLENE 4-0 RB-1 8557H

## (undated) DEVICE — SUT SILK 3-0 SH C013D

## (undated) DEVICE — GAUZE SPONGES,12 PLY: Brand: CURITY

## (undated) DEVICE — SOLUTION  .9 1000ML BTL

## (undated) DEVICE — CLIP INTERNAL HORIZON LG HEART

## (undated) DEVICE — SUTURE WIRE SINGLE STERNOTOMY

## (undated) DEVICE — Device: Brand: VIRTUOSAPH PLUS WITH RADIAL INDICATION

## (undated) DEVICE — SYRINGE 30ML LL TIP

## (undated) DEVICE — SUT PROLENE 3-0 SH 8522H

## (undated) DEVICE — TRANSPOSAL ULTRAFLEX DUO/QUAD ULTRA CART MANIFOLD

## (undated) DEVICE — ABSORBABLE HEMOSTAT (OXIDIZED REGENERATED CELLULOSE, U.S.P.): Brand: SURGICEL

## (undated) DEVICE — SUTURE WIRE DOUBLE STERNOTOMY

## (undated) DEVICE — INTENDED TO BE USED TO OCCLUDE, RETRACT AND IDENTIFY ARTERIES, VEINS, TENDONS AND NERVES IN SURGICAL PROCEDURES: Brand: STERION®  VESSEL LOOP

## (undated) DEVICE — BLOWER CLEARVIEW KIT

## (undated) DEVICE — CELL SAVER RESERVOIR

## (undated) DEVICE — CONTAINER CELL SAVER 600ML

## (undated) DEVICE — STERILE POLYISOPRENE POWDER-FREE SURGICAL GLOVES: Brand: PROTEXIS

## (undated) DEVICE — SUT POLYDEK 2-0 ME-2 69-414

## (undated) DEVICE — C-ARM: Brand: UNBRANDED

## (undated) DEVICE — 3M™ STERI-DRAPE™ INSTRUMENT POUCH 1018: Brand: STERI-DRAPE™

## (undated) DEVICE — OPEN HEART: Brand: MEDLINE INDUSTRIES, INC.

## (undated) DEVICE — LEAD PACING STREAMLINE BIPOLAR

## (undated) DEVICE — GLOVE SURG TRIUMPH SZ 8

## (undated) NOTE — LETTER
[facility] [address], [address]  Consent for Procedure/Sedation    Date: 6/22/22    Time: 9912      1. I authorize the performance upon Shaq Boone the following:cardiac catheterization, left ventricular cineangiography, bilateral selective coronary angiography and/or right heart catheterization; possible percutaneous transluminal coronary angioplasty, coronary atherectomy, coronary stent, intracoronary thrombolytic therapy, antiplatelet therapy and/or intravascular ultrasound. 2. I authorize Dr. Rohan Yousif  (and whomever is designated as the doctor's assistant), to perform the above-mentioned procedures. 3. If any unforeseen conditions arise during this procedure calling for additional procedures, operations, or medications (including anesthesia and blood transfusion), I further request and authorize the doctor to do whatever he/she deems advisable in my interest.  4. I consent to the taking and reproduction of any photographs in the course of this procedure for professional purposes. 5. I consent to the administration of such sedation as may be considered necessary, or advisable by the physician responsible for this service. 6. I have been informed by my doctor of the nature and purpose of this procedure and sedation, possible alternative methods of treatment, risk involved and possible complications. In the event your procedure results in extended X-Ray/fluoroscopy time, you may develop a skin reaction. 7. If I have a Do Not Attempt Resuscitation (DNAR) order in place, that status will be suspended while in the operating room, procedural suite, and during the recovery period unless otherwise explicitly stated by me (or a person authorized to consent on my behalf). The surgeon or my attending physician will determine when the applicable recovery period ends for purposes of reinstating the DNAR order.     Signature of Patient: ____________________________________________________    Signature of person authorized                                     Relationship to  to consent for patient: _________________________ patient: ___________________    Witness: _______________________________ Date: _____________________    Printed: 2022   10:52 AM  Patient Name: Sharon Violet VOGELB: 1970       Medical Record #: [id number]

## (undated) NOTE — Clinical Note
Would you be ok with him splitting his dose and taking tresiba 2x daily to help cover the evening numbers a little more?  He was not open to switching completely

## (undated) NOTE — LETTER
Your patient was recently seen at the [facility] for a hospital follow-up visit. The visit note is attached. Please contact the clinic with any questions at [phone].     Thank you,  MUKUND Weldon

## (undated) NOTE — Clinical Note
Hi Dr Veronica Linton and his wife today for endo post-hospital f/u. I'm sending him to DM center for DM APN and CDE services.  Thanks! -Alejandro Riddle

## (undated) NOTE — LETTER
[address] [facility] FOR BLOOD OR BLOOD COMPONENTS      In the course of your treatment, it may become necessary to administer a transfusion of blood or blood components. This form provides basic information concerning this procedure and, if signed by you, authorizes its performance by qualified medical personnel. DESCRIPTION OF PROCEDURE:  Blood is introduced into one of your veins, commonly in the arm, using a sterilized disposable needle. The amount of blood transfused, and whether the transfusion will be of blood or blood components is a judgment the physician will make based on your particular needs. RISKS:  The transfusion is a common procedure of low risk. MINOR AND TEMPORARY REACTIONS ARE NOT UNCOMMON, including a slight bruise, swelling or local reaction in the area where the needle pierces your skin, or a non-serious reaction to the transfused material itself, including headache, fever or a mild skin reaction, such as rash. Serious reactions are possible, though very unlikely and include severe allergic reaction (shock)  and destruction (hemolysis) of transfused blood cells. Infectious diseases which are known to be transmitted by blood transfusion include CERTAIN TYPES OF VIRAL HEPATITIS, a viral infection of the liver, HUMAN IMMUNODEFICIENCY VIRUS (HIV-1,2) infection, a viral infection known to cause ACQUIRED IMMUNODEFICIENCY SYNDROME (AIDS) AS WELL AS CERTAIN OTHER BACTERIAL, VIRAL AND PARASITIC DISEASES. While a minimal risk of acquiring an infectious disease from transfused blood exists, in accordance with Federal and State law all due care has been taken in donor selection and testing to avoid transmission of disease. ALTERNATIVES:  If loss of blood poses serious threats in the course of your treatment, THERE IS NO EFFECTIVE ALTERNATIVE TO BLOOD TRANSFUSION.  However, if you have any further questions on this matter, your physician will fully explain the alternatives to you if it has not already been done. I,Shaq Boone, have read/had read to me the above. I understand the matters bearing on the decision whether or not to authorize a transfusion of blood or blood components. I have no questions which have not been answered to my full satisfaction.  I hereby consent to such transfusion as  my physician may deem necessary or advisable in the course of my treatment.        _______________   __________________________________________________  Date     Signature of Patient, Parent or Legal Guardian      ([facility])      __________________________________________  Witness to Signature (title or relationship to patient)    Patient Name: Tiff Main     : 1970                 Printed: 2022     Medical Record #: [id number]                    Page 1 of 1

## (undated) NOTE — LETTER
Annette Kuo M.D., F.A.C.S. Vasquez Wells M.D., F.A.C.S. Jill Hylton M.D., Daryle Bosworth. MARSHALL John M.D., F.A.C.SJen Galvan. Joceline Coto M.D., F.A.C.S. PIA Heck M. Excell Masson A.D. Morris Shutters, M.D., F.A.C.S. Bulmaro Sheffield. Hattie Shi M.D., F.A.C.S. Trena Lewis M.D., F.A.C.S. Onofre Cuadra M.D., F.A.C.S. Nolan Brambila M.D. F.A.C.SJen Baeza. Dylan Hubbard M.D., F.A.C.S. Lonnie Garner M.D., F.A.C.S. Francesca Cedillo M.D., F.A.C.S., F.A.C.CJen Steinberg M.D., F.A.C.S. Sedrick Cantrell M.D., F.A.C.S. Kaden Maharaj M.D., F.A.C.S. Mayte Muller. Annabel Severance, M.D., F.A.C.S. PIA Isbell M.D., Daryle Bosworth. C.S  Christi Ba M.D. Homero Patel M.D., F.A.C.S. Wing Piña. Za Main M.D., F.A.C.S. Lima Collins M.D. Chanda Valles M.D.  Margaret Merritt M.D. PhD.  Loki Kulkarni M.D. Stacey Parham M.D. LEMUEL Abbott. Laci Arreguin M.D. Torres Jean M.D. Abhay Myers M.D. Hawk Shell M.D.   Christiano Malin D.O., PILAR. Catherine Pacheco M.D., PILAR. Connor Howard M.D. Welcome to [facility] As you contemplate possible surgical treatment, it is very important to us that you understand fully what is being discussed, that all of your questions have been answered, and that your options for treatment have been fully explained. To that end, on the following page we will ask you some questions to make certain that you understand everything which has been explained to you. Included in this understanding is that there are both surgical and nonsurgical treatments available for you, that you have options regarding where your care is given, and what doctors are involved in your care. Included in these options would, of course, be the option to elect for no treatment whatsoever.  We especially want to be sure that you have had a chance to have all of your questions and concerns answered. If there are any issues which have not been adequately addressed, we ask you to bring them forward so that we can thoroughly address them. A patient who is fully informed and understands their condition and options for treatment, as well as potential adverse effects of treatment, is going to be a patient who receives the most benefit out of care rendered. Our goal in addition to providing excellent surgical care is to provide the necessary information to you and your family in order to make decisions which are appropriate relative to your own care. Please take the time necessary to read and answer the questions on the next page. Again, if you have any questions, bring them forward and we will certainly address them. Sincerely,    [facility]    _______________________  ____________________________________  Date:                                             Patient Signature  ________________________  Jennifer Barraza  Witness Consent Form         Revised: 2015  Patient Name: Jennifer Barraza     : 1970                 Printed: 6/15/13 9:43 AM     Medical Record #: [id number]                Page: 1 of  2        [facility] Supplemental Consent Form    A [facility] has met with me and explained the matter of my illness, and what treatments might be available to improve my condition. As a result of that conversation, I understand the following:    A [facility] surgeon met with me and explained, in detail, the nature of my condition for which surgery is being contemplated.  The procedure to be performed  Is: CORONARY ARTERY BYPASS GRAFT            Yes _____ No _____    A [facility] surgeon has explained to me that there are alternatives to surgery which might include no surgery, medical therapy, or interventional treatment, among other options and the risks and benefits of the different treatment options:    Yes _____ No _____    A [facility] surgeon as explained to me that if I should so desire, he/she is willing to explain my case and the surgical and non-surgical options to family members: Yes _____ No _____    A [facility] surgeon has answered all of my questions regarding the topics we have discussed. I have been invited to ask more questions:  Yes _____ No _____    A [facility] surgeon has explained to me that if I seek other options or wish treatment at another facility in [place] or [place], or anywhere in the [place], that his/her office will assist me in making such accommodations:   Yes _____ No _____    A [facility] surgeon has explained to me, that death, risk of bleeding, stroke, multi-organ failure, heart attack or other complications are risks for the proposed surgical procedure: Yes _____ No _____    A [facility] surgeon has explained to me that I have the right to cancel or postpone the surgery at any time prior to the start of surgery: Yes _____ No _____    The nature and options for treatment for my condition have been explained to me, in detail, by a [facility] surgeon and all questions have been answered to my satisfaction. I understand that I am not required to undergo surgery, and further, that if I so desire, I could have surgery accomplished by another surgeon or at another institution. I understand and accept that which has been explained to me.  I am able to make my decisions knowingly and willfully based on the data.    ______________________   __________________________________  Date       Patient Signature  ______________________________ Sawyer Ada  Witness Consent Form   Patient Name: Climmie Kussmaul: 7/5/1970                 Medical Record #: [id number]    Page: 2 of 2        Printed: June 22, 2022

## (undated) NOTE — LETTER
Nanette Burleson M.D., F.A.C.S. Dave Allen M.D., F.A.C.S. Melvin Farris M.D., Cat Galindo M.D., F.A.C.S. Monserrat Gordillo. Gail Bowden M.D., F.A.C.S. Soren Aguiar M.D. TEJAS Mayer M.D., F.A.C.S. Jose Cruz. Megan Coronado M.D., F.A.C.S. Robi Gallego M.D., F.A.C.S. Cristofer Sveerino M.D., F.A.C.S. Hermes Guillen M.D. F.A.C.S. Michelle Mata. Irvin Abebe M.D., F.A.C.S. Lonnie Coburn M.D., F.A.C.S. Ervin Marsh M.D., F.A.C.S., F.A.C.CJen Tafoya M.D., F.A.C.S. Sedrick Lebron M.D., F.A.C.S. Enzo Marie M.D., F.A.C.S. [facility]. Caron Davalos M.D., F.A.C.S. Phu Hernandez M.D. Erin Valle M.D., Cat Kraus M.D. Alba Hooper M.D., F.A.C.S. Gabriela William. Authur Meckel, M.D., F.A.C.S. Mitch Mahoney M.D. Kiran Keyes M.D.  Pat Tobin M.D. PhD.  Maggy Griffith M.D. Marly Morataya M.D. F A DANILO Archer. Nemesio Garcia M.D. Chaparrita De Leon M.D. Marilynn Mills M.D. Shani Long M.D.   Fidelina Ryan D.O., F.A.C.S. Idris Chapa M.D., F.A.C.S. Destin Lancaster M.D. Welcome to [facility] As you contemplate possible surgical treatment, it is very important to us that you understand fully what is being discussed, that all of your questions have been answered, and that your options for treatment have been fully explained. To that end, on the following page we will ask you some questions to make certain that you understand everything which has been explained to you. Included in this understanding is that there are both surgical and nonsurgical treatments available for you, that you have options regarding where your care is given, and what doctors are involved in your care. Included in these options would, of course, be the option to elect for no treatment whatsoever.  We especially want to be sure that you have had a chance to have all of your questions and concerns answered. If there are any issues which have not been adequately addressed, we ask you to bring them forward so that we can thoroughly address them. A patient who is fully informed and understands their condition and options for treatment, as well as potential adverse effects of treatment, is going to be a patient who receives the most benefit out of care rendered. Our goal in addition to providing excellent surgical care is to provide the necessary information to you and your family in order to make decisions which are appropriate relative to your own care. Please take the time necessary to read and answer the questions on the next page. Again, if you have any questions, bring them forward and we will certainly address them. Sincerely,    [facility]    _______________________  ____________________________________  Date:                                             Patient Signature  ________________________  Brett Brandt  Witness Consent Form         Revised: 2015  Patient Name: Brett Brandt     : 1970                 Printed: 6/15/13 9:43 AM     Medical Record #: [id number]                Page: 1 of  2        [facility] Supplemental Consent Form    [facility] has met with me and explained the matter of my illness, and what treatments might be available to improve my condition. As a result of that conversation, I understand the following:    A [facility] surgeon met with me and explained, in detail, the nature of my condition for which surgery is being contemplated. The procedure to be performed  Is:              Yes _____ No _____    A [facility] surgeon has explained to me that there are alternatives to surgery which might include no surgery, medical therapy, or interventional treatment, among other options and the risks and benefits of the different treatment options:    Yes _____ No _____    A [facility] surgeon as explained to me that if I should so desire, he/she is willing to explain my case and the surgical and non-surgical options to family members: Yes _____ No _____    A [facility] surgeon has answered all of my questions regarding the topics we have discussed. I have been invited to ask more questions:  Yes _____ No _____    A [facility] surgeon has explained to me that if I seek other options or wish treatment at another facility in [place] or [place], or anywhere in the [place], that his/her office will assist me in making such accommodations:   Yes _____ No _____    A [facility] surgeon has explained to me, that death, risk of bleeding, stroke, multi-organ failure, heart attack or other complications are risks for the proposed surgical procedure: Yes _____ No _____    A [facility] surgeon has explained to me that I have the right to cancel or postpone the surgery at any time prior to the start of surgery: Yes _____ No _____    The nature and options for treatment for my condition have been explained to me, in detail, by a [facility] surgeon and all questions have been answered to my satisfaction. I understand that I am not required to undergo surgery, and further, that if I so desire, I could have surgery accomplished by another surgeon or at another institution. I understand and accept that which has been explained to me.  I am able to make my decisions knowingly and willfully based on the data.    ______________________   __________________________________  Date       Patient Signature  ______________________________ Bushra Sax  Witness Consent Form   Patient Name: Jas Coad: 7/5/1970                 Medical Record #: [id number]    Page: 2 of 2        Printed: June 22, 2022